# Patient Record
Sex: MALE | Race: OTHER | Employment: OTHER | URBAN - METROPOLITAN AREA
[De-identification: names, ages, dates, MRNs, and addresses within clinical notes are randomized per-mention and may not be internally consistent; named-entity substitution may affect disease eponyms.]

---

## 2018-10-18 ENCOUNTER — OFFICE VISIT (OUTPATIENT)
Dept: FAMILY MEDICINE CLINIC | Facility: CLINIC | Age: 68
End: 2018-10-18
Payer: MEDICARE

## 2018-10-18 VITALS
DIASTOLIC BLOOD PRESSURE: 72 MMHG | RESPIRATION RATE: 18 BRPM | SYSTOLIC BLOOD PRESSURE: 140 MMHG | HEIGHT: 64.2 IN | WEIGHT: 176 LBS | OXYGEN SATURATION: 99 % | HEART RATE: 98 BPM | TEMPERATURE: 98 F | BODY MASS INDEX: 30.05 KG/M2

## 2018-10-18 DIAGNOSIS — I10 ESSENTIAL HYPERTENSION, BENIGN: ICD-10-CM

## 2018-10-18 DIAGNOSIS — E11.9 TYPE 2 DIABETES MELLITUS WITHOUT COMPLICATION, WITHOUT LONG-TERM CURRENT USE OF INSULIN (HCC): Primary | ICD-10-CM

## 2018-10-18 DIAGNOSIS — E66.9 OBESITY (BMI 30-39.9): ICD-10-CM

## 2018-10-18 DIAGNOSIS — E78.5 HYPERLIPIDEMIA, UNSPECIFIED HYPERLIPIDEMIA TYPE: ICD-10-CM

## 2018-10-18 PROCEDURE — 80061 LIPID PANEL: CPT | Performed by: FAMILY MEDICINE

## 2018-10-18 PROCEDURE — 80053 COMPREHEN METABOLIC PANEL: CPT | Performed by: FAMILY MEDICINE

## 2018-10-18 PROCEDURE — 99213 OFFICE O/P EST LOW 20 MIN: CPT | Performed by: FAMILY MEDICINE

## 2018-10-18 PROCEDURE — 83036 HEMOGLOBIN GLYCOSYLATED A1C: CPT | Performed by: FAMILY MEDICINE

## 2018-10-18 PROCEDURE — 36415 COLL VENOUS BLD VENIPUNCTURE: CPT | Performed by: FAMILY MEDICINE

## 2018-10-18 RX ORDER — ATORVASTATIN CALCIUM 20 MG/1
TABLET, FILM COATED ORAL
Qty: 90 TABLET | Refills: 1 | Status: SHIPPED | OUTPATIENT
Start: 2018-10-18 | End: 2019-06-20

## 2018-10-18 RX ORDER — LOSARTAN POTASSIUM 100 MG/1
100 TABLET ORAL
Refills: 0 | COMMUNITY
Start: 2018-08-13 | End: 2018-11-28

## 2018-10-18 RX ORDER — ATORVASTATIN CALCIUM 20 MG/1
TABLET, FILM COATED ORAL
COMMUNITY
Start: 2018-06-12 | End: 2018-10-18

## 2018-10-18 NOTE — PROGRESS NOTES
CHIEF COMPLAINT: Patient presents with:  Diabetes: NP  Medication Request        HPI:     Jennifer Osorio is a 70year old male presents for DM2, HTN, and HL. Diabetes   He presents for his follow-up diabetic visit.  He has type 2 diabetes m than 1 year ago. The problem is controlled. Exacerbating diseases include diabetes and obesity. There are no known factors aggravating his hyperlipidemia. Pertinent negatives include no chest pain, leg pain, myalgias or shortness of breath.  Current antihyp Cardiovascular: Negative for chest pain, palpitations and PND. Gastrointestinal: Negative for nausea, vomiting, abdominal pain and diarrhea. Endocrine: Negative for polydipsia, polyphagia and polyuria. Musculoskeletal: Negative for myalgias.    Iwona Million last diabetic foot exam: 10/18/18  - last diabetic eye exam: March 2018 (Dr. Sven Cooks in [facility])  - cont meds: Metformin 500mg  daily  - discussed appropriate diabetic diet, regular exercise, and wt loss  - RTC in 6 months for f-u  - HEMOGLOBIN A1C; Fu and agrees with plan. Advised to call or RTC if symptoms persist or worsen.     Problem List:   Patient Active Problem List:     Benign essential hypertension     Hyperlipidemia     Type 2 diabetes mellitus without complication [facility])      Imaging & Referral

## 2018-10-18 NOTE — PATIENT INSTRUCTIONS
Diet: Diabetes  Food is an important tool that you can use to control diabetes and stay healthy. Eating well-balanced meals in the correct amounts will help you control your blood glucose levels and prevent low blood sugar reactions.  It will also help yo · Avoid added salt. It can contribute to high blood pressure, which can cause heart disease. People with diabetes already have a risk of high blood pressure and heart disease. · Stay at a healthy weight.  If you need to lose weight, cut down on your portio · High-fat dairy products, such as whole milk, cheese, cream cheese, and ice cream  Better choices:  · Lean beef, skinless white-meat poultry, fish  · Tomato sauce, vegetable puree  · Dried fruit, bagels, bread with jam  · Baked, broiled, steamed, or roast

## 2018-10-22 ENCOUNTER — TELEPHONE (OUTPATIENT)
Dept: FAMILY MEDICINE CLINIC | Facility: CLINIC | Age: 68
End: 2018-10-22

## 2018-10-22 NOTE — PROGRESS NOTES
A1c is slightly worse, up to 7.0. Cholesterol is at goal. Please continue Metformin and Atorvastatin as Rx'd. Has f-u appt scheduled in April 2019.

## 2018-11-28 RX ORDER — LOSARTAN POTASSIUM 100 MG/1
100 TABLET ORAL
Qty: 90 TABLET | Refills: 0 | Status: SHIPPED | OUTPATIENT
Start: 2018-11-28 | End: 2019-01-16

## 2018-11-28 NOTE — TELEPHONE ENCOUNTER
Pt requesting refill on Losartan, protocol passed, refill approved    LOV 10/8/18    LF prior provider    Future Appointments   Date Time Provider Nestor Mcgraw   4/18/2019 12:20 PM Jerardo Cervantes MD [facility] [place]

## 2018-12-13 ENCOUNTER — TELEPHONE (OUTPATIENT)
Dept: FAMILY MEDICINE CLINIC | Facility: CLINIC | Age: 68
End: 2018-12-13

## 2018-12-13 NOTE — TELEPHONE ENCOUNTER
Pt states he had his diabetic eye exam done in March 2018 at [facility]. [address] [place] [address] [phone]. Please call Dr. Kris Alan to have them fax us a copy. Thank you.

## 2018-12-18 NOTE — TELEPHONE ENCOUNTER
Spoke with Leslee Brittle at [facility], she will fax report.  Chart will be updated upon receipt

## 2019-01-17 RX ORDER — LOSARTAN POTASSIUM 100 MG/1
TABLET ORAL
Qty: 90 TABLET | Refills: 0 | Status: SHIPPED | OUTPATIENT
Start: 2019-01-17 | End: 2019-05-21

## 2019-01-17 NOTE — TELEPHONE ENCOUNTER
Pt requesting a refill of losartan, protocol passed. Refill approved.     LOV with PCP 10/18/18    LF 11/28/18 #90    Future Appointments   Date Time Provider Nestor Mcgraw   4/18/2019 12:20 PM Ludivina Guevara MD [facility] [place]

## 2019-02-15 DIAGNOSIS — E11.9 TYPE 2 DIABETES MELLITUS WITHOUT COMPLICATION, UNSPECIFIED WHETHER LONG TERM INSULIN USE (HCC): Primary | ICD-10-CM

## 2019-02-15 NOTE — TELEPHONE ENCOUNTER
Pt requesting refill of metformin, passed protocol , refill approved, sent to pharmacy:     Last Time Medication was Filled:  pt reported    Last Office Visit with PCP: 10/18/18    Future Appointments   Date Time Provider Nestor Mcgraw   4/18/2019 12

## 2019-05-28 RX ORDER — LOSARTAN POTASSIUM 100 MG/1
TABLET ORAL
Qty: 90 TABLET | Refills: 0 | Status: SHIPPED | OUTPATIENT
Start: 2019-05-28 | End: 2019-11-13

## 2019-05-31 ENCOUNTER — APPOINTMENT (OUTPATIENT)
Dept: LAB | Facility: HOSPITAL | Age: 69
End: 2019-05-31
Attending: FAMILY MEDICINE
Payer: MEDICARE

## 2019-05-31 DIAGNOSIS — E11.9 TYPE 2 DIABETES MELLITUS WITHOUT COMPLICATION, WITHOUT LONG-TERM CURRENT USE OF INSULIN (HCC): ICD-10-CM

## 2019-05-31 DIAGNOSIS — E78.5 HYPERLIPIDEMIA, UNSPECIFIED HYPERLIPIDEMIA TYPE: ICD-10-CM

## 2019-05-31 PROCEDURE — 83036 HEMOGLOBIN GLYCOSYLATED A1C: CPT

## 2019-05-31 PROCEDURE — 36415 COLL VENOUS BLD VENIPUNCTURE: CPT

## 2019-05-31 PROCEDURE — 80053 COMPREHEN METABOLIC PANEL: CPT

## 2019-05-31 PROCEDURE — 82043 UR ALBUMIN QUANTITATIVE: CPT

## 2019-05-31 PROCEDURE — 82570 ASSAY OF URINE CREATININE: CPT

## 2019-05-31 PROCEDURE — 80061 LIPID PANEL: CPT

## 2019-06-07 ENCOUNTER — TELEPHONE (OUTPATIENT)
Dept: FAMILY MEDICINE CLINIC | Facility: CLINIC | Age: 69
End: 2019-06-07

## 2019-06-07 NOTE — TELEPHONE ENCOUNTER
Spoke with patient discussed lab results, diabetes and cholesterol are well controlled. Patient has AWV scheduled with Dr. Luis Garvey on 6/20/2019. Patient verbalized understanding and agreeable to POC.

## 2019-06-07 NOTE — TELEPHONE ENCOUNTER
Result Notes for COMP METABOLIC PANEL (14)     Notes recorded by Dar Cifuentes DO on 6/7/2019 at 4:01 PM CDT  Please call patient needs to schedule diabetic visit with Dr. Juan Grimaldo and of June third week of June.  His diabetes and cholesterol are well co

## 2019-06-20 ENCOUNTER — OFFICE VISIT (OUTPATIENT)
Dept: FAMILY MEDICINE CLINIC | Facility: CLINIC | Age: 69
End: 2019-06-20
Payer: MEDICARE

## 2019-06-20 VITALS
BODY MASS INDEX: 29.9 KG/M2 | SYSTOLIC BLOOD PRESSURE: 152 MMHG | DIASTOLIC BLOOD PRESSURE: 70 MMHG | WEIGHT: 173 LBS | OXYGEN SATURATION: 99 % | TEMPERATURE: 98 F | RESPIRATION RATE: 16 BRPM | HEIGHT: 63.75 IN | HEART RATE: 73 BPM

## 2019-06-20 DIAGNOSIS — Z23 NEED FOR VACCINATION: ICD-10-CM

## 2019-06-20 DIAGNOSIS — Z00.00 ENCOUNTER FOR ROUTINE ADULT HEALTH EXAMINATION WITHOUT ABNORMAL FINDINGS: ICD-10-CM

## 2019-06-20 DIAGNOSIS — I10 ESSENTIAL HYPERTENSION, BENIGN: ICD-10-CM

## 2019-06-20 DIAGNOSIS — E11.9 TYPE 2 DIABETES MELLITUS WITHOUT COMPLICATION, UNSPECIFIED WHETHER LONG TERM INSULIN USE (HCC): ICD-10-CM

## 2019-06-20 DIAGNOSIS — E78.5 HYPERLIPIDEMIA, UNSPECIFIED HYPERLIPIDEMIA TYPE: ICD-10-CM

## 2019-06-20 DIAGNOSIS — Z12.5 SPECIAL SCREENING FOR MALIGNANT NEOPLASM OF PROSTATE: Primary | ICD-10-CM

## 2019-06-20 DIAGNOSIS — E11.9 TYPE 2 DIABETES MELLITUS WITHOUT COMPLICATION, WITHOUT LONG-TERM CURRENT USE OF INSULIN (HCC): ICD-10-CM

## 2019-06-20 PROCEDURE — G0439 PPPS, SUBSEQ VISIT: HCPCS | Performed by: FAMILY MEDICINE

## 2019-06-20 PROCEDURE — 99214 OFFICE O/P EST MOD 30 MIN: CPT | Performed by: FAMILY MEDICINE

## 2019-06-20 PROCEDURE — 36415 COLL VENOUS BLD VENIPUNCTURE: CPT | Performed by: FAMILY MEDICINE

## 2019-06-20 PROCEDURE — 90670 PCV13 VACCINE IM: CPT | Performed by: FAMILY MEDICINE

## 2019-06-20 PROCEDURE — G0009 ADMIN PNEUMOCOCCAL VACCINE: HCPCS | Performed by: FAMILY MEDICINE

## 2019-06-20 RX ORDER — ATORVASTATIN CALCIUM 20 MG/1
TABLET, FILM COATED ORAL
Qty: 90 TABLET | Refills: 1 | Status: SHIPPED | OUTPATIENT
Start: 2019-06-20 | End: 2019-11-19

## 2019-06-20 RX ORDER — HYDROCHLOROTHIAZIDE 25 MG/1
25 TABLET ORAL DAILY
Qty: 90 TABLET | Refills: 1 | Status: SHIPPED | OUTPATIENT
Start: 2019-06-20 | End: 2019-11-19

## 2019-06-20 NOTE — PROGRESS NOTES
Patient came in for draw of ordered fasting labs. Patient drawn out of right  AC, x 1 attempt and tolerated well. 1 tube drawn.

## 2019-06-20 NOTE — PROGRESS NOTES
REASON FOR VISIT:    Zunilda De Jesus is a 78year old male who presents for a Medicare Annual Wellness visit. Pt is here for medicare AWV. Overall he feels well. He has no complaints. His colonoscopy was done in Aug 2017.  He is due for his treatments include angiotensin blockers. There are no compliance problems. There is no history of PVD or retinopathy. Hyperlipidemia   This is a chronic problem. The current episode started more than 1 year ago. The problem is controlled.  Exacerbating you lost more than 10 pounds without trying?: 2 - No  Has your appetite been poor?: No  Type of Diet: Balanced  How does the patient maintain a good energy level?: Appropriate Exercise;Daily Walks  How would you describe your daily physical activity?: Mode Annually HgbA1C (%)   Date Value   05/31/2019 6.9 (H)       Fasting Blood Sugar (FSB)Annually Glucose (mg/dL)   Date Value   05/31/2019 114 (H)      Cardiovascular Disease Screening    LDL Annually LDL Cholesterol (mg/dL)   Date Value   05/31/2019 68 Grandfather    • No Known Problems Paternal Grandmother    • No Known Problems Paternal Grandfather    • No Known Problems Sister    • No Known Problems Brother    • No Known Problems Brother      Immunization History      Immunization History  Administere or edema  NEURO: Oriented times three, cranial nerves are intact, motor and sensory are grossly intact  FOOT: normal exam and diabetic monofilament testing normal on both feet  Bilateral barefoot skin diabetic exam is normal, visualized feet and the appear (14); Future  -     LIPID PANEL;  Future    Essential hypertension, benign  - BP elevated today, pt mentions BP readings at home are in the 150s  - start HCTZ 25 mg daily (R/B/SEs of new med d/w pt) and cont losartan 100 mg daily  - DASH diet, regular exerc

## 2019-08-21 DIAGNOSIS — I10 BENIGN ESSENTIAL HYPERTENSION: Primary | ICD-10-CM

## 2019-08-21 RX ORDER — LOSARTAN POTASSIUM 100 MG/1
TABLET ORAL
Qty: 90 TABLET | Refills: 0 | Status: SHIPPED | OUTPATIENT
Start: 2019-08-21 | End: 2019-11-13

## 2019-08-21 NOTE — TELEPHONE ENCOUNTER
Pt requesting refill of LOSARTAN 100 MG  , passed protocol ,  Will route to provider this medication has not been filled by provider previously     Last Time Medication was Filled:      LOSARTAN 100 MG Oral Tab 90 tablet 0 5/28/2019    Sig:   TAKE 1 TABL

## 2019-09-06 DIAGNOSIS — E11.9 TYPE 2 DIABETES MELLITUS WITHOUT COMPLICATION, UNSPECIFIED WHETHER LONG TERM INSULIN USE (HCC): ICD-10-CM

## 2019-09-14 DIAGNOSIS — E11.9 TYPE 2 DIABETES MELLITUS WITHOUT COMPLICATION, UNSPECIFIED WHETHER LONG TERM INSULIN USE (HCC): ICD-10-CM

## 2019-09-20 ENCOUNTER — MED REC SCAN ONLY (OUTPATIENT)
Dept: FAMILY MEDICINE CLINIC | Facility: CLINIC | Age: 69
End: 2019-09-20

## 2019-10-15 ENCOUNTER — LAB ENCOUNTER (OUTPATIENT)
Dept: LAB | Facility: HOSPITAL | Age: 69
End: 2019-10-15
Attending: FAMILY MEDICINE
Payer: MEDICARE

## 2019-10-15 DIAGNOSIS — E78.5 HYPERLIPIDEMIA, UNSPECIFIED HYPERLIPIDEMIA TYPE: ICD-10-CM

## 2019-10-15 DIAGNOSIS — E11.9 TYPE 2 DIABETES MELLITUS WITHOUT COMPLICATION, WITHOUT LONG-TERM CURRENT USE OF INSULIN (HCC): ICD-10-CM

## 2019-10-15 PROCEDURE — 80061 LIPID PANEL: CPT

## 2019-10-15 PROCEDURE — 36415 COLL VENOUS BLD VENIPUNCTURE: CPT

## 2019-10-15 PROCEDURE — 80053 COMPREHEN METABOLIC PANEL: CPT

## 2019-10-15 PROCEDURE — 83036 HEMOGLOBIN GLYCOSYLATED A1C: CPT

## 2019-11-13 DIAGNOSIS — I10 BENIGN ESSENTIAL HYPERTENSION: ICD-10-CM

## 2019-11-13 RX ORDER — LOSARTAN POTASSIUM 100 MG/1
TABLET ORAL
Qty: 90 TABLET | Refills: 0 | Status: SHIPPED | OUTPATIENT
Start: 2019-11-13 | End: 2019-11-19

## 2019-11-13 NOTE — TELEPHONE ENCOUNTER
Requested Prescriptions     Signed Prescriptions Disp Refills   • LOSARTAN 100 MG Oral Tab 90 tablet 0     Sig: TAKE 1 TABLET BY MOUTH EVERY DAY     Authorizing Provider: Jt Roach     Ordering User: Isabel Rapp       Pt requesting refill

## 2019-11-15 PROBLEM — I10 HTN (HYPERTENSION): Status: ACTIVE | Noted: 2019-01-16

## 2019-11-19 ENCOUNTER — OFFICE VISIT (OUTPATIENT)
Dept: FAMILY MEDICINE CLINIC | Facility: CLINIC | Age: 69
End: 2019-11-19
Payer: MEDICARE

## 2019-11-19 VITALS
DIASTOLIC BLOOD PRESSURE: 60 MMHG | WEIGHT: 166.19 LBS | BODY MASS INDEX: 28.72 KG/M2 | TEMPERATURE: 99 F | SYSTOLIC BLOOD PRESSURE: 128 MMHG | HEIGHT: 63.75 IN | OXYGEN SATURATION: 98 % | HEART RATE: 67 BPM

## 2019-11-19 DIAGNOSIS — E78.5 HYPERLIPIDEMIA, UNSPECIFIED HYPERLIPIDEMIA TYPE: ICD-10-CM

## 2019-11-19 DIAGNOSIS — E11.9 TYPE 2 DIABETES MELLITUS WITHOUT COMPLICATION, UNSPECIFIED WHETHER LONG TERM INSULIN USE (HCC): Primary | ICD-10-CM

## 2019-11-19 DIAGNOSIS — I10 ESSENTIAL HYPERTENSION, BENIGN: ICD-10-CM

## 2019-11-19 DIAGNOSIS — Z78.9 HEPATITIS B VACCINATION STATUS UNKNOWN: ICD-10-CM

## 2019-11-19 PROCEDURE — 99214 OFFICE O/P EST MOD 30 MIN: CPT | Performed by: FAMILY MEDICINE

## 2019-11-19 RX ORDER — HYDROCHLOROTHIAZIDE 25 MG/1
25 TABLET ORAL DAILY
Qty: 90 TABLET | Refills: 1 | Status: CANCELLED | OUTPATIENT
Start: 2019-11-19

## 2019-11-19 RX ORDER — LOSARTAN POTASSIUM AND HYDROCHLOROTHIAZIDE 25; 100 MG/1; MG/1
1 TABLET ORAL DAILY
Qty: 90 TABLET | Refills: 3 | Status: SHIPPED | OUTPATIENT
Start: 2019-11-19 | End: 2019-11-20 | Stop reason: RX

## 2019-11-19 RX ORDER — ATORVASTATIN CALCIUM 20 MG/1
TABLET, FILM COATED ORAL
Qty: 90 TABLET | Refills: 1 | Status: SHIPPED | OUTPATIENT
Start: 2019-11-19 | End: 2020-06-17

## 2019-11-19 NOTE — PATIENT INSTRUCTIONS
As of October 6th 2014, the Drug Enforcement Agency [facility]) is reclassifying all hydrocodone combination medications from Schedule III to Schedule II. This includes medications such as Norco, Vicodin, Lortab, Zohydro, and Vicoprofen.      What this means for

## 2019-11-20 RX ORDER — HYDROCHLOROTHIAZIDE 25 MG/1
TABLET ORAL
Qty: 90 TABLET | Refills: 1 | Status: SHIPPED | OUTPATIENT
Start: 2019-11-20 | End: 2020-05-14

## 2019-11-20 RX ORDER — LOSARTAN POTASSIUM 100 MG/1
100 TABLET ORAL DAILY
Qty: 90 TABLET | Refills: 1 | Status: SHIPPED | OUTPATIENT
Start: 2019-11-20 | End: 2020-07-29

## 2019-11-20 NOTE — TELEPHONE ENCOUNTER
Requested Prescriptions     Signed Prescriptions Disp Refills   • HYDROCHLOROTHIAZIDE 25 MG Oral Tab 90 tablet 1     Sig: TAKE 1 TABLET BY MOUTH EVERY DAY     Authorizing Provider: Bronwyn De Dios     Ordering User: MANDIE La   • losartan 100

## 2019-11-29 DIAGNOSIS — E11.9 TYPE 2 DIABETES MELLITUS WITHOUT COMPLICATION, UNSPECIFIED WHETHER LONG TERM INSULIN USE (HCC): ICD-10-CM

## 2019-11-29 NOTE — TELEPHONE ENCOUNTER
Requested Prescriptions     Signed Prescriptions Disp Refills   • metFORMIN HCl 500 MG Oral Tab 90 tablet 0     Sig: TAKE 1 TABLET BY MOUTH Daily     Authorizing Provider: Roswell Curling     Ordering User: Unique Powell     Refused Prescriptio

## 2019-12-14 DIAGNOSIS — E11.9 TYPE 2 DIABETES MELLITUS WITHOUT COMPLICATION, UNSPECIFIED WHETHER LONG TERM INSULIN USE (HCC): ICD-10-CM

## 2020-02-18 ENCOUNTER — MED REC SCAN ONLY (OUTPATIENT)
Dept: FAMILY MEDICINE CLINIC | Facility: CLINIC | Age: 70
End: 2020-02-18

## 2020-02-20 DIAGNOSIS — E11.9 TYPE 2 DIABETES MELLITUS WITHOUT COMPLICATION, UNSPECIFIED WHETHER LONG TERM INSULIN USE (HCC): ICD-10-CM

## 2020-02-21 NOTE — TELEPHONE ENCOUNTER
Pt requesting refill of   Requested Prescriptions     Pending Prescriptions Disp Refills   • metFORMIN HCl 500 MG Oral Tab [Pharmacy Med Name: metFORMIN HCl Oral Tablet 500 MG] 90 tablet 0     Sig: TAKE 1 TABLET BY MOUTH ONE TIME A DAY   Passed protocol, r

## 2020-04-23 DIAGNOSIS — E11.9 TYPE 2 DIABETES MELLITUS WITHOUT COMPLICATION, UNSPECIFIED WHETHER LONG TERM INSULIN USE (HCC): ICD-10-CM

## 2020-05-14 DIAGNOSIS — I10 ESSENTIAL HYPERTENSION, BENIGN: ICD-10-CM

## 2020-05-14 RX ORDER — HYDROCHLOROTHIAZIDE 25 MG/1
TABLET ORAL
Qty: 90 TABLET | Refills: 1 | Status: SHIPPED | OUTPATIENT
Start: 2020-05-14 | End: 2020-10-28

## 2020-05-19 ENCOUNTER — LAB ENCOUNTER (OUTPATIENT)
Dept: LAB | Facility: HOSPITAL | Age: 70
End: 2020-05-19
Attending: FAMILY MEDICINE
Payer: MEDICARE

## 2020-05-19 DIAGNOSIS — Z78.9 HEPATITIS B VACCINATION STATUS UNKNOWN: ICD-10-CM

## 2020-05-19 DIAGNOSIS — E11.9 TYPE 2 DIABETES MELLITUS WITHOUT COMPLICATION, UNSPECIFIED WHETHER LONG TERM INSULIN USE (HCC): ICD-10-CM

## 2020-05-19 PROCEDURE — 83036 HEMOGLOBIN GLYCOSYLATED A1C: CPT

## 2020-05-19 PROCEDURE — 86706 HEP B SURFACE ANTIBODY: CPT

## 2020-05-19 PROCEDURE — 80053 COMPREHEN METABOLIC PANEL: CPT

## 2020-05-19 PROCEDURE — 82570 ASSAY OF URINE CREATININE: CPT

## 2020-05-19 PROCEDURE — 80061 LIPID PANEL: CPT

## 2020-05-19 PROCEDURE — 80500 HEPATITIS B PROFILE: CPT

## 2020-05-19 PROCEDURE — 82043 UR ALBUMIN QUANTITATIVE: CPT

## 2020-05-19 PROCEDURE — 86704 HEP B CORE ANTIBODY TOTAL: CPT

## 2020-05-19 PROCEDURE — 36415 COLL VENOUS BLD VENIPUNCTURE: CPT

## 2020-05-19 PROCEDURE — 87340 HEPATITIS B SURFACE AG IA: CPT

## 2020-05-20 ENCOUNTER — TELEPHONE (OUTPATIENT)
Dept: FAMILY MEDICINE CLINIC | Facility: CLINIC | Age: 70
End: 2020-05-20

## 2020-05-20 NOTE — TELEPHONE ENCOUNTER
Your appointments     Date & Time Appointment Department [facility])    Jun 17, 2020 10:40 AM CDT Follow Up Visit with Alissa Daniels MD [place] [id number], [facility] David Durand ([address])            [address] [facility], [place]

## 2020-05-20 NOTE — TELEPHONE ENCOUNTER
Please call pt to schedule Medicare AWV via telephone visit ONLY if he has BP cuff and scale (since we have to report weight and BP during this type of visit).  If not, then please schedule him for just a regular f-u telephone visit for his diabetes, HL, an

## 2020-05-20 NOTE — PROGRESS NOTES
Sent message to triage RN to call pt schedule Medicare AWV and to review these results (if pt has BP cuff and scale at home), if not, then needs a telephone f-u visit:    - CMP normal (a little dehydrated with elevated BUN)  - lipids ok  - A1c 7.3 (worse,

## 2020-05-26 NOTE — PROGRESS NOTES
Pt notified via MyChart:  - CMP normal (a little dehydrated with elevated BUN)  - lipids ok  - A1c 7.3 (worse, up from 7.1)  - urine microalbumin normal  - Hep B- no immunity, recommend vaccine

## 2020-06-17 ENCOUNTER — OFFICE VISIT (OUTPATIENT)
Dept: FAMILY MEDICINE CLINIC | Facility: CLINIC | Age: 70
End: 2020-06-17
Payer: MEDICARE

## 2020-06-17 VITALS
HEIGHT: 63.75 IN | HEART RATE: 82 BPM | DIASTOLIC BLOOD PRESSURE: 72 MMHG | OXYGEN SATURATION: 96 % | SYSTOLIC BLOOD PRESSURE: 158 MMHG | BODY MASS INDEX: 29.28 KG/M2 | TEMPERATURE: 98 F | WEIGHT: 169.38 LBS | RESPIRATION RATE: 18 BRPM

## 2020-06-17 DIAGNOSIS — I10 ESSENTIAL HYPERTENSION, BENIGN: Primary | ICD-10-CM

## 2020-06-17 DIAGNOSIS — E78.5 HYPERLIPIDEMIA, UNSPECIFIED HYPERLIPIDEMIA TYPE: ICD-10-CM

## 2020-06-17 DIAGNOSIS — E11.9 TYPE 2 DIABETES MELLITUS WITHOUT COMPLICATION, UNSPECIFIED WHETHER LONG TERM INSULIN USE (HCC): ICD-10-CM

## 2020-06-17 PROCEDURE — 99214 OFFICE O/P EST MOD 30 MIN: CPT | Performed by: FAMILY MEDICINE

## 2020-06-17 RX ORDER — ATORVASTATIN CALCIUM 20 MG/1
TABLET, FILM COATED ORAL
Qty: 90 TABLET | Refills: 1 | Status: SHIPPED | OUTPATIENT
Start: 2020-06-17 | End: 2020-12-21

## 2020-06-18 RX ORDER — ATORVASTATIN CALCIUM 20 MG/1
TABLET, FILM COATED ORAL
Qty: 90 TABLET | Refills: 0 | OUTPATIENT
Start: 2020-06-18

## 2020-06-18 NOTE — PROGRESS NOTES
CHIEF COMPLAINT: Patient presents with:  Diabetes        HPI:     Andressa Casas is a 69year old male presents for lab results f-u. Diabetes f-u: He feels well. He states that he has not had any issues with his sugars.  He has no polyuria, started more than 1 year ago. The problem is unchanged. The problem is controlled. Pertinent negatives include no anxiety, blurred vision, chest pain, headaches, palpitations, peripheral edema, PND or shortness of breath.  There are no associated agents to Problems Brother    • No Known Problems Brother       Social History: Social History    Tobacco Use      Smoking status: Never Smoker      Smokeless tobacco: Never Used    Alcohol use: No      Frequency: Never    Drug use: No       Medications (Active prio sounds normal. No respiratory distress. Abdominal: Soft. Bowel sounds are normal. He exhibits no distension. There is no hepatosplenomegaly. There is no tenderness. Lymphadenopathy:     He has no cervical adenopathy.    Neurological: He is alert and denny Nonreactive     • Heptatitis B Surface Ab * 05/19/2020 <3.10    • Hepatitis B Core Ab,Total 05/19/2020 Nonreactive        REVIEWED THIS VISIT  ASSESSMENT/PLAN:   69year old male with    1.  Essential hypertension, benign  - BP elevated, improved on recheck 06/20/2020  Annual Physical due on 06/20/2020  Diabetes Care A1C due on 11/19/2020  Diabetes Care Dilated Eye Exam due on 02/17/2021  LDL Control due on 05/19/2021  PSA due on 06/20/2021  Colonoscopy due on 08/03/2022  Influenza Vaccine Completed  Pneumoco

## 2020-06-21 DIAGNOSIS — E11.9 TYPE 2 DIABETES MELLITUS WITHOUT COMPLICATION, UNSPECIFIED WHETHER LONG TERM INSULIN USE (HCC): ICD-10-CM

## 2020-06-22 NOTE — TELEPHONE ENCOUNTER
Pt requesting refill of   Requested Prescriptions     Signed Prescriptions Disp Refills   • metFORMIN HCl 500 MG Oral Tab 90 tablet 0     Sig: TAKE 1 TABLET BY MOUTH ONE TIME A DAY     Authorizing Provider: Ashley Chauhan     Ordering User: Yamilet Espinoza

## 2020-07-09 ENCOUNTER — TELEPHONE (OUTPATIENT)
Dept: FAMILY MEDICINE CLINIC | Facility: CLINIC | Age: 70
End: 2020-07-09

## 2020-07-27 ENCOUNTER — TELEPHONE (OUTPATIENT)
Dept: FAMILY MEDICINE CLINIC | Facility: CLINIC | Age: 70
End: 2020-07-27

## 2020-07-27 DIAGNOSIS — E11.9 TYPE 2 DIABETES MELLITUS WITHOUT COMPLICATION, UNSPECIFIED WHETHER LONG TERM INSULIN USE (HCC): ICD-10-CM

## 2020-07-27 NOTE — TELEPHONE ENCOUNTER
Pt requesting refill of   Requested Prescriptions     Pending Prescriptions Disp Refills   • metFORMIN HCl 500 MG Oral Tab [Pharmacy Med Name: METFORMIN  MG TABLET] 90 tablet 0     Sig: TAKE 1 TABLET BY MOUTH TWICE A DAY WITH MEALS     Denied refi

## 2020-07-29 ENCOUNTER — TELEPHONE (OUTPATIENT)
Dept: FAMILY MEDICINE CLINIC | Facility: CLINIC | Age: 70
End: 2020-07-29

## 2020-07-29 DIAGNOSIS — E11.9 TYPE 2 DIABETES MELLITUS WITHOUT COMPLICATION, UNSPECIFIED WHETHER LONG TERM INSULIN USE (HCC): ICD-10-CM

## 2020-07-29 DIAGNOSIS — I10 ESSENTIAL HYPERTENSION, BENIGN: Primary | ICD-10-CM

## 2020-07-29 RX ORDER — LOSARTAN POTASSIUM 100 MG/1
100 TABLET ORAL DAILY
Qty: 90 TABLET | Refills: 1 | Status: SHIPPED | OUTPATIENT
Start: 2020-07-29 | End: 2021-01-06

## 2020-07-29 NOTE — TELEPHONE ENCOUNTER
Patient returning call. Per patient, he was instructed to take Losartan BID at 6/17/2020 office visit. Has been doing so since that time. Per med list and office note, there is no mention of dose increase.  Records indicate that he is prescribed Losar

## 2020-07-29 NOTE — TELEPHONE ENCOUNTER
Per pharmacy note, patient reports taking Losartan BID. No record of this. Call placed to patient for clarification. Did another doctor make this change? LMOM to return call to the office.  Provided pt office phone [phone] along with office ho

## 2020-07-29 NOTE — TELEPHONE ENCOUNTER
Losartan was unchanged at 6/17 visit, he is still to take daily as prescribed. Per my OV note that day, Metformin was CHANGED to 500 mg BID (from daily). (I just updated the medication list now, since I did not do it at the time of the visit).     Thank

## 2020-07-29 NOTE — TELEPHONE ENCOUNTER
Call placed to patient. Advised patient to take losartan 100 mg once daily and metformin 500 mg BID. Patient verbalized understanding.

## 2020-08-04 ENCOUNTER — PATIENT OUTREACH (OUTPATIENT)
Dept: FAMILY MEDICINE CLINIC | Facility: CLINIC | Age: 70
End: 2020-08-04

## 2020-08-05 DIAGNOSIS — E11.9 TYPE 2 DIABETES MELLITUS WITHOUT COMPLICATION, UNSPECIFIED WHETHER LONG TERM INSULIN USE (HCC): ICD-10-CM

## 2020-08-06 NOTE — TELEPHONE ENCOUNTER
Requested Prescriptions     Signed Prescriptions Disp Refills   • metFORMIN HCl 500 MG Oral Tab 90 tablet 0     Sig: Take 1 tablet (500 mg total) by mouth 2 (two) times daily with meals.  Type 2 diabetes mellitus without complication, unspecified whether lo

## 2020-09-13 DIAGNOSIS — E11.9 TYPE 2 DIABETES MELLITUS WITHOUT COMPLICATION, UNSPECIFIED WHETHER LONG TERM INSULIN USE (HCC): ICD-10-CM

## 2020-09-14 NOTE — TELEPHONE ENCOUNTER
Pt requesting refill of   Requested Prescriptions     Pending Prescriptions Disp Refills   • METFORMIN  MG Oral Tab [Pharmacy Med Name: METFORMIN  MG TABLET] 90 tablet 0     Sig: TAKE 1 TABLET BY MOUTH 2 TIMES DAILY WITH MEALS.      Passed p

## 2020-09-17 ENCOUNTER — OFFICE VISIT (OUTPATIENT)
Dept: FAMILY MEDICINE CLINIC | Facility: CLINIC | Age: 70
End: 2020-09-17
Payer: MEDICARE

## 2020-09-17 VITALS
DIASTOLIC BLOOD PRESSURE: 82 MMHG | OXYGEN SATURATION: 97 % | WEIGHT: 165.19 LBS | RESPIRATION RATE: 16 BRPM | HEIGHT: 63.75 IN | TEMPERATURE: 99 F | BODY MASS INDEX: 28.55 KG/M2 | HEART RATE: 67 BPM | SYSTOLIC BLOOD PRESSURE: 128 MMHG

## 2020-09-17 DIAGNOSIS — I10 ESSENTIAL HYPERTENSION, BENIGN: ICD-10-CM

## 2020-09-17 DIAGNOSIS — E78.5 HYPERLIPIDEMIA, UNSPECIFIED HYPERLIPIDEMIA TYPE: ICD-10-CM

## 2020-09-17 DIAGNOSIS — Z00.00 ENCOUNTER FOR ROUTINE ADULT HEALTH EXAMINATION WITHOUT ABNORMAL FINDINGS: Primary | ICD-10-CM

## 2020-09-17 DIAGNOSIS — E11.9 TYPE 2 DIABETES MELLITUS WITHOUT COMPLICATION, UNSPECIFIED WHETHER LONG TERM INSULIN USE (HCC): ICD-10-CM

## 2020-09-17 DIAGNOSIS — E66.3 OVERWEIGHT (BMI 25.0-29.9): ICD-10-CM

## 2020-09-17 DIAGNOSIS — Z23 FLU VACCINE NEED: ICD-10-CM

## 2020-09-17 PROCEDURE — G0008 ADMIN INFLUENZA VIRUS VAC: HCPCS | Performed by: FAMILY MEDICINE

## 2020-09-17 PROCEDURE — G0439 PPPS, SUBSEQ VISIT: HCPCS | Performed by: FAMILY MEDICINE

## 2020-09-17 PROCEDURE — 90662 IIV NO PRSV INCREASED AG IM: CPT | Performed by: FAMILY MEDICINE

## 2020-09-17 NOTE — PATIENT INSTRUCTIONS
Influenza Virus Vaccine injection  Brand Names: Afluria Quadrivalent, FLUAD, Fluarix Quadrivalent, Flublok Quadrivalent, FLUCELVAX, Flulaval, Fluzone  What is this medicine?   INFLUENZA VIRUS VACCINE (in [facility]) helps to reduce the What should I tell my health care provider before I take this medicine?   They need to know if you have any of these conditions:  · bleeding disorder like hemophilia  · fever or infection  · Guillain-[place] syndrome or other neurological problems  · immune s

## 2020-09-17 NOTE — PROGRESS NOTES
REASON FOR VISIT:    Andressa Casas is a 69year old male who presents for a Medicare Annual Wellness visit. Diabetes f-u: He states he feels his blood sugar is well-controlled. Dashawn Ireland His fasting blood sugars are in the 130s average.   He has n mellitus without complication (HCC)     HTN (hypertension)    Current Outpatient Medications   Medication Sig Dispense Refill   • METFORMIN  MG Oral Tab TAKE 1 TABLET BY MOUTH 2 TIMES DAILY WITH MEALS.  90 tablet 0   • losartan 100 MG Oral Tab Take 1 without help  Eating: Able without help  Driving: Able without help  Preparing your meals: Able without help  Managing money/bills: Able without help  Taking medications as prescribed: Able without help  Are you able to afford your medications?: Yes  Adrianna Diaz Medications  (ACE/ARB, Digoxin, Diuretics)        Potassium  Annually Potassium (mmol/L)   Date Value   05/19/2020 4.0       Creatinine  Annually Creatinine (mg/dL)   Date Value   05/19/2020 1.01       Digoxin Serum Conc  Annually No results found for: DIG shortness of breath with exertion  CARDIOVASCULAR: denies chest pain on exertion  GI: denies abdominal pain, denies heartburn  : denies nocturia or changes in stream  MUSCULOSKELETAL: denies back pain  NEURO: denies headaches  PSYCHE: denies depression o dose  - RTC in 3 months for f-u (please complete fasting  labs prior to visit)     - LIPID PANEL; Future  - COMP METABOLIC PANEL (14); Future     2.  Type 2 diabetes mellitus without complication, unspecified whether long term insulin use (HCC)  - last A1c

## 2020-10-23 DIAGNOSIS — E11.9 TYPE 2 DIABETES MELLITUS WITHOUT COMPLICATION, UNSPECIFIED WHETHER LONG TERM INSULIN USE (HCC): ICD-10-CM

## 2020-10-26 NOTE — TELEPHONE ENCOUNTER
Pt requesting refill of   Requested Prescriptions     Pending Prescriptions Disp Refills   • METFORMIN  MG Oral Tab [Pharmacy Med Name: METFORMIN  MG TABLET] 90 tablet 0     Sig: TAKE 1 TABLET BY MOUTH TWICE A DAY WITH MEALS       Passed pr

## 2020-10-28 DIAGNOSIS — I10 ESSENTIAL HYPERTENSION, BENIGN: ICD-10-CM

## 2020-10-28 RX ORDER — HYDROCHLOROTHIAZIDE 25 MG/1
TABLET ORAL
Qty: 90 TABLET | Refills: 0 | Status: SHIPPED | OUTPATIENT
Start: 2020-10-28 | End: 2020-12-21

## 2020-10-28 NOTE — TELEPHONE ENCOUNTER
Pt requesting refill of   Requested Prescriptions     Pending Prescriptions Disp Refills   • HYDROCHLOROTHIAZIDE 25 MG Oral Tab [Pharmacy Med Name: HYDROCHLOROTHIAZIDE 25 MG TAB] 90 tablet 1     Sig: TAKE 1 TABLET BY MOUTH EVERY DAY     Passed protocol,

## 2020-11-04 ENCOUNTER — LAB ENCOUNTER (OUTPATIENT)
Dept: LAB | Facility: HOSPITAL | Age: 70
End: 2020-11-04
Attending: FAMILY MEDICINE
Payer: MEDICARE

## 2020-11-04 DIAGNOSIS — I10 ESSENTIAL HYPERTENSION, BENIGN: ICD-10-CM

## 2020-11-04 DIAGNOSIS — E78.5 HYPERLIPIDEMIA, UNSPECIFIED HYPERLIPIDEMIA TYPE: ICD-10-CM

## 2020-11-04 DIAGNOSIS — E11.9 TYPE 2 DIABETES MELLITUS WITHOUT COMPLICATION, UNSPECIFIED WHETHER LONG TERM INSULIN USE (HCC): ICD-10-CM

## 2020-11-04 PROCEDURE — 36415 COLL VENOUS BLD VENIPUNCTURE: CPT

## 2020-11-04 PROCEDURE — 83036 HEMOGLOBIN GLYCOSYLATED A1C: CPT

## 2020-11-04 PROCEDURE — 80053 COMPREHEN METABOLIC PANEL: CPT

## 2020-11-04 PROCEDURE — 80061 LIPID PANEL: CPT

## 2020-11-05 NOTE — PROGRESS NOTES
- diabetes is improved, A1c down to 7.0 (on Metformin 500mg BID)  - lipids imrproved, TG still borderline  - LFTs and Cr are essentially normal (slight dehydration since fasting)

## 2020-12-02 DIAGNOSIS — E11.9 TYPE 2 DIABETES MELLITUS WITHOUT COMPLICATION, UNSPECIFIED WHETHER LONG TERM INSULIN USE (HCC): ICD-10-CM

## 2020-12-02 NOTE — TELEPHONE ENCOUNTER
Pt requesting refill of   Requested Prescriptions     Pending Prescriptions Disp Refills   • METFORMIN  MG Oral Tab [Pharmacy Med Name: METFORMIN  MG TABLET] 90 tablet 0     Sig: TAKE 1 TABLET BY MOUTH TWICE A DAY WITH MEALS     Passed protoc

## 2020-12-21 ENCOUNTER — OFFICE VISIT (OUTPATIENT)
Dept: FAMILY MEDICINE CLINIC | Facility: CLINIC | Age: 70
End: 2020-12-21
Payer: MEDICARE

## 2020-12-21 VITALS
WEIGHT: 165 LBS | OXYGEN SATURATION: 98 % | RESPIRATION RATE: 18 BRPM | HEART RATE: 78 BPM | HEIGHT: 63.75 IN | DIASTOLIC BLOOD PRESSURE: 66 MMHG | SYSTOLIC BLOOD PRESSURE: 142 MMHG | TEMPERATURE: 97 F | BODY MASS INDEX: 28.52 KG/M2

## 2020-12-21 DIAGNOSIS — E78.5 HYPERLIPIDEMIA, UNSPECIFIED HYPERLIPIDEMIA TYPE: ICD-10-CM

## 2020-12-21 DIAGNOSIS — I10 ESSENTIAL HYPERTENSION, BENIGN: ICD-10-CM

## 2020-12-21 DIAGNOSIS — E11.9 TYPE 2 DIABETES MELLITUS WITHOUT COMPLICATION, UNSPECIFIED WHETHER LONG TERM INSULIN USE (HCC): ICD-10-CM

## 2020-12-21 PROCEDURE — 99214 OFFICE O/P EST MOD 30 MIN: CPT | Performed by: FAMILY MEDICINE

## 2020-12-21 RX ORDER — HYDROCHLOROTHIAZIDE 25 MG/1
25 TABLET ORAL DAILY
Qty: 90 TABLET | Refills: 1 | Status: SHIPPED | OUTPATIENT
Start: 2020-12-21 | End: 2021-08-25

## 2020-12-21 RX ORDER — ATORVASTATIN CALCIUM 20 MG/1
TABLET, FILM COATED ORAL
Qty: 90 TABLET | Refills: 1 | OUTPATIENT
Start: 2020-12-21

## 2020-12-21 RX ORDER — ATORVASTATIN CALCIUM 20 MG/1
TABLET, FILM COATED ORAL
Qty: 90 TABLET | Refills: 1 | Status: SHIPPED | OUTPATIENT
Start: 2020-12-21 | End: 2021-06-07

## 2020-12-21 RX ORDER — LOSARTAN POTASSIUM 100 MG/1
100 TABLET ORAL DAILY
Qty: 90 TABLET | Refills: 1 | Status: CANCELLED | OUTPATIENT
Start: 2020-12-21

## 2020-12-21 NOTE — PROGRESS NOTES
CHIEF COMPLAINT: Patient presents with:  Diabetes: follow up         HPI:     Coby Castellanos is a 69year old male presents for DM2, HTN, HL f-u. Diabetes f-u: His most recent A1c was down to 7.0, he is feeling well.  His blood sugar has be No Known Problems Son    • No Known Problems Maternal Grandmother    • No Known Problems Maternal Grandfather    • No Known Problems Paternal Grandmother    • No Known Problems Paternal Grandfather    • No Known Problems Sister    • No Known Problems Broth reviewed. Appears stated age, well groomed. Physical Exam   Vitals reviewed. Constitutional: He is oriented to person, place, and time. He appears well-developed and well-nourished. No distress. HENT:   Head: Normocephalic.    Eyes: Conjunctivae are nor VISIT  ASSESSMENT/PLAN:   69year old male with    1.  Type 2 diabetes mellitus without complication, unspecified whether long term insulin use (HCC)  - last A1c in Dec  2020 : 7.0 (down from 7.3)  - last microalbumin: May 2020 (normal)  - last diabetic zackary 02/17/2021  Diabetes Care A1C due on 05/04/2021  PSA due on 06/20/2021  Fall Risk Screening due on 09/17/2021  Annual Depression Screen due on 09/17/2021  Annual Physical due on 09/17/2021  LDL Control due on 11/04/2021  Colonoscopy due on 08/03/2022  Infl

## 2021-01-06 DIAGNOSIS — I10 ESSENTIAL HYPERTENSION, BENIGN: ICD-10-CM

## 2021-01-06 RX ORDER — LOSARTAN POTASSIUM 100 MG/1
TABLET ORAL
Qty: 90 TABLET | Refills: 1 | Status: SHIPPED | OUTPATIENT
Start: 2021-01-06 | End: 2021-08-09

## 2021-01-06 NOTE — TELEPHONE ENCOUNTER
Pt requesting refill of   Requested Prescriptions     Pending Prescriptions Disp Refills   • LOSARTAN 100 MG Oral Tab [Pharmacy Med Name: LOSARTAN POTASSIUM 100 MG TAB] 90 tablet 1     Sig: TAKE 1 TABLET BY MOUTH EVERY DAY     Passed protocol, refill appro

## 2021-02-03 DIAGNOSIS — Z23 NEED FOR VACCINATION: ICD-10-CM

## 2021-02-15 ENCOUNTER — LAB ENCOUNTER (OUTPATIENT)
Dept: LAB | Age: 71
End: 2021-02-15
Attending: FAMILY MEDICINE
Payer: MEDICARE

## 2021-02-15 DIAGNOSIS — E11.9 TYPE 2 DIABETES MELLITUS WITHOUT COMPLICATION, UNSPECIFIED WHETHER LONG TERM INSULIN USE (HCC): ICD-10-CM

## 2021-02-15 LAB
ALBUMIN SERPL-MCNC: 3.8 G/DL (ref 3.4–5)
ALBUMIN/GLOB SERPL: 0.9 {RATIO} (ref 1–2)
ALP LIVER SERPL-CCNC: 71 U/L
ALT SERPL-CCNC: 18 U/L
ANION GAP SERPL CALC-SCNC: 4 MMOL/L (ref 0–18)
AST SERPL-CCNC: 17 U/L (ref 15–37)
BILIRUB SERPL-MCNC: 0.6 MG/DL (ref 0.1–2)
BUN BLD-MCNC: 18 MG/DL (ref 7–18)
BUN/CREAT SERPL: 17.3 (ref 10–20)
CALCIUM BLD-MCNC: 10 MG/DL (ref 8.5–10.1)
CHLORIDE SERPL-SCNC: 104 MMOL/L (ref 98–112)
CHOLEST SMN-MCNC: 166 MG/DL (ref ?–200)
CO2 SERPL-SCNC: 30 MMOL/L (ref 21–32)
CREAT BLD-MCNC: 1.04 MG/DL
CREAT UR-SCNC: 45.6 MG/DL
EST. AVERAGE GLUCOSE BLD GHB EST-MCNC: 148 MG/DL (ref 68–126)
GLOBULIN PLAS-MCNC: 4.4 G/DL (ref 2.8–4.4)
GLUCOSE BLD-MCNC: 126 MG/DL (ref 70–99)
HBA1C MFR BLD HPLC: 6.8 % (ref ?–5.7)
HDLC SERPL-MCNC: 45 MG/DL (ref 40–59)
LDLC SERPL CALC-MCNC: 100 MG/DL (ref ?–100)
M PROTEIN MFR SERPL ELPH: 8.2 G/DL (ref 6.4–8.2)
MICROALBUMIN UR-MCNC: 1.86 MG/DL
MICROALBUMIN/CREAT 24H UR-RTO: 40.8 UG/MG (ref ?–30)
NONHDLC SERPL-MCNC: 121 MG/DL (ref ?–130)
OSMOLALITY SERPL CALC.SUM OF ELEC: 289 MOSM/KG (ref 275–295)
PATIENT FASTING Y/N/NP: YES
PATIENT FASTING Y/N/NP: YES
POTASSIUM SERPL-SCNC: 3.9 MMOL/L (ref 3.5–5.1)
SODIUM SERPL-SCNC: 138 MMOL/L (ref 136–145)
TRIGL SERPL-MCNC: 106 MG/DL (ref 30–149)
VLDLC SERPL CALC-MCNC: 21 MG/DL (ref 0–30)

## 2021-02-15 PROCEDURE — 82043 UR ALBUMIN QUANTITATIVE: CPT

## 2021-02-15 PROCEDURE — 82570 ASSAY OF URINE CREATININE: CPT

## 2021-02-15 PROCEDURE — 83036 HEMOGLOBIN GLYCOSYLATED A1C: CPT

## 2021-02-15 PROCEDURE — 36415 COLL VENOUS BLD VENIPUNCTURE: CPT

## 2021-02-15 PROCEDURE — 80053 COMPREHEN METABOLIC PANEL: CPT

## 2021-02-15 PROCEDURE — 80061 LIPID PANEL: CPT

## 2021-02-16 NOTE — PROGRESS NOTES
Pt notified via [email or web address]:  - A1c improved, down to 6.8 (from 7.0)  - microalbumin abnormal  - lipids at goal   - CMP normal    Has upcoming appt on 3/30/21

## 2021-02-18 ENCOUNTER — TELEPHONE (OUTPATIENT)
Dept: FAMILY MEDICINE CLINIC | Facility: CLINIC | Age: 71
End: 2021-02-18

## 2021-03-29 ENCOUNTER — TELEPHONE (OUTPATIENT)
Dept: FAMILY MEDICINE CLINIC | Facility: CLINIC | Age: 71
End: 2021-03-29

## 2021-03-29 NOTE — TELEPHONE ENCOUNTER
Spoke to pt and pt's wife. Pt and his wife tested positive for Covid approx 2 weeks ago and have been quarantined at home ever since  No longer having fevers  Ongoing symptoms include non-stop cough.    Denies SOB difficulty breathing or other symptoms   Un

## 2021-04-05 ENCOUNTER — TELEPHONE (OUTPATIENT)
Dept: FAMILY MEDICINE CLINIC | Facility: CLINIC | Age: 71
End: 2021-04-05

## 2021-04-05 NOTE — TELEPHONE ENCOUNTER
He should f-u with Pulm about the O2. Thanks. If he passes screening for 4/14 visit and feels well enough to come in from [place], we'll see him then. Thanks!

## 2021-04-05 NOTE — TELEPHONE ENCOUNTER
Incoming call from pt's wife  She states pt was at ER in [place] for Covid pneumonia 3/30/21 - record in [facility]  He was put on oxygen upon discharge  They do not have the home O2 monitor yet   Pt has an appt 4/14/21 with Cherelle Diana MD. As

## 2021-04-06 NOTE — TELEPHONE ENCOUNTER
Spoke to pt's wife Quita Valle and let her know that pt should f/up with pulmonology regarding O2 use. She states they will f/up with pulm seen in [place].   Anjali Reddy for appt 4/14/21 as long as he is symptom free x 24 hours prior to appt and passes screening   Q[place]a Valle voiced

## 2021-04-14 ENCOUNTER — OFFICE VISIT (OUTPATIENT)
Dept: FAMILY MEDICINE CLINIC | Facility: CLINIC | Age: 71
End: 2021-04-14
Payer: MEDICARE

## 2021-04-14 VITALS
SYSTOLIC BLOOD PRESSURE: 136 MMHG | WEIGHT: 152.81 LBS | BODY MASS INDEX: 26.41 KG/M2 | DIASTOLIC BLOOD PRESSURE: 72 MMHG | HEART RATE: 100 BPM | TEMPERATURE: 97 F | HEIGHT: 63.75 IN | OXYGEN SATURATION: 91 % | RESPIRATION RATE: 22 BRPM

## 2021-04-14 DIAGNOSIS — U07.1 COVID-19 VIRUS INFECTION: Primary | ICD-10-CM

## 2021-04-14 DIAGNOSIS — I10 ESSENTIAL HYPERTENSION, BENIGN: ICD-10-CM

## 2021-04-14 DIAGNOSIS — E11.9 TYPE 2 DIABETES MELLITUS WITHOUT COMPLICATION, UNSPECIFIED WHETHER LONG TERM INSULIN USE (HCC): ICD-10-CM

## 2021-04-14 DIAGNOSIS — R05.9 COUGH: ICD-10-CM

## 2021-04-14 DIAGNOSIS — E78.5 HYPERLIPIDEMIA, UNSPECIFIED HYPERLIPIDEMIA TYPE: ICD-10-CM

## 2021-04-14 PROCEDURE — 1111F DSCHRG MED/CURRENT MED MERGE: CPT | Performed by: FAMILY MEDICINE

## 2021-04-14 PROCEDURE — 99214 OFFICE O/P EST MOD 30 MIN: CPT | Performed by: FAMILY MEDICINE

## 2021-04-14 RX ORDER — BENZONATATE 100 MG/1
100 CAPSULE ORAL
COMMUNITY
Start: 2021-03-25 | End: 2021-04-14

## 2021-04-14 RX ORDER — DEXAMETHASONE 6 MG/1
TABLET ORAL
COMMUNITY
Start: 2021-03-30 | End: 2021-04-14 | Stop reason: ALTCHOICE

## 2021-04-14 RX ORDER — BENZONATATE 100 MG/1
100 CAPSULE ORAL 3 TIMES DAILY PRN
Qty: 30 CAPSULE | Refills: 1 | Status: SHIPPED | OUTPATIENT
Start: 2021-04-14 | End: 2021-06-25

## 2021-04-14 RX ORDER — AMOXICILLIN AND CLAVULANATE POTASSIUM 875; 125 MG/1; MG/1
1 TABLET, FILM COATED ORAL 2 TIMES DAILY
COMMUNITY
Start: 2021-03-25 | End: 2021-04-14 | Stop reason: ALTCHOICE

## 2021-04-15 NOTE — PROGRESS NOTES
CHIEF COMPLAINT: Patient presents with:  Hospital F/U        HPI:     Yue Crook is a 66year old male presents for lab results f-u and Covid f-u. Covid-19 infection f-u: Pt was Covid+ on 3/25/21.   He had just received the Covid vaccin HA, CP, palpitations, and no leg swelling. He is tolerating the Losartan and HCTZ both very well. Hyperlipidemia f-u: He contnues to take Atorvastatin as prescribed- he is tolerating well. He has no muscle aches and pains.             HISTORY:  Past Med fatigue. Negative for activity change, appetite change, chills, diaphoresis, fever and unexpected weight change. Eyes: Negative for visual disturbance. Respiratory: Positive for cough. Negative for chest tightness, shortness of breath and wheezing. Mood normal.         Behavior: Behavior normal.          LABS     [facility] Lab Encounter on 02/15/2021   Component Date Value   • Glucose 02/15/2021 126*   • Sodium 02/15/2021 138    • Potassium 02/15/2021 3.9    • Chloride 02/15/2021 104    • CO2 02/15/2021 30 complete fasting  labs prior to visit)    - COMP METABOLIC PANEL (14); Future  - LIPID PANEL; Future    4.  Hyperlipidemia, unspecified hyperlipidemia type  - 2/2021 lipids at goal    - cont Atorvastatin 20 mg daily  - d/w pt a healthy, low-fat/low-choleste List:     Benign essential hypertension     Hyperlipidemia     Type 2 diabetes mellitus without complication (HCC)     HTN (hypertension)      Imaging & Referrals:  None     4/14/2021  Lizette Yeboah MD      Patient understands plan and follow-up.

## 2021-06-06 DIAGNOSIS — E78.5 HYPERLIPIDEMIA, UNSPECIFIED HYPERLIPIDEMIA TYPE: ICD-10-CM

## 2021-06-07 RX ORDER — ATORVASTATIN CALCIUM 20 MG/1
TABLET, FILM COATED ORAL
Qty: 90 TABLET | Refills: 1 | Status: SHIPPED | OUTPATIENT
Start: 2021-06-07 | End: 2021-11-29

## 2021-06-07 NOTE — TELEPHONE ENCOUNTER
Pt requesting refill of   Requested Prescriptions     Pending Prescriptions Disp Refills   • atorvastatin 20 MG Oral Tab [Pharmacy Med Name: ATORVASTATIN 20 MG TABLET] 90 tablet 1     Sig: TAKE 1 TABLET BY MOUTH EVERY DAY     Passed protocol, refill appr

## 2021-06-25 ENCOUNTER — OFFICE VISIT (OUTPATIENT)
Dept: FAMILY MEDICINE CLINIC | Facility: CLINIC | Age: 71
End: 2021-06-25
Payer: MEDICARE

## 2021-06-25 VITALS
HEIGHT: 63 IN | RESPIRATION RATE: 16 BRPM | HEART RATE: 77 BPM | OXYGEN SATURATION: 96 % | SYSTOLIC BLOOD PRESSURE: 136 MMHG | DIASTOLIC BLOOD PRESSURE: 80 MMHG | BODY MASS INDEX: 27.85 KG/M2 | TEMPERATURE: 98 F | WEIGHT: 157.19 LBS

## 2021-06-25 DIAGNOSIS — R05.9 COUGH: Primary | ICD-10-CM

## 2021-06-25 PROCEDURE — 99213 OFFICE O/P EST LOW 20 MIN: CPT | Performed by: NURSE PRACTITIONER

## 2021-06-25 RX ORDER — BENZONATATE 200 MG/1
200 CAPSULE ORAL 3 TIMES DAILY PRN
Qty: 30 CAPSULE | Refills: 0 | Status: SHIPPED | OUTPATIENT
Start: 2021-06-25 | End: 2021-08-25 | Stop reason: ALTCHOICE

## 2021-06-25 NOTE — PROGRESS NOTES
HPI/Subjective:   Patient ID: Artemisa Angelucci is a 66year old male. Patient presents to the clinic today for evaluation of cough. He is accompanied by his wife. Patient with hx of covid infection that led to pneumonia in late March 2021. He is well-developed. Cardiovascular:      Rate and Rhythm: Normal rate and regular rhythm. Heart sounds: Normal heart sounds. Pulmonary:      Effort: Pulmonary effort is normal.      Breath sounds: Normal breath sounds.    Skin:     General: Skin

## 2021-06-25 NOTE — PATIENT INSTRUCTIONS
Please complete xray   Continue to take tessalon pearls  Warm liquids to help soothe the throat. ER precautions for chest pain, shortness of breath, nausea and vomiting, or worsening/concerning symptoms.

## 2021-07-04 DIAGNOSIS — I10 ESSENTIAL HYPERTENSION, BENIGN: ICD-10-CM

## 2021-07-06 RX ORDER — BENZONATATE 200 MG/1
CAPSULE ORAL
Qty: 30 CAPSULE | Refills: 0 | OUTPATIENT
Start: 2021-07-06

## 2021-07-06 NOTE — TELEPHONE ENCOUNTER
Patient is to follow-up with the office should symptoms persist or worsen. Tessalon Perles not meant for long-term use.

## 2021-07-06 NOTE — TELEPHONE ENCOUNTER
Pt requesting refill of   Requested Prescriptions     Pending Prescriptions Disp Refills   • BENZONATATE 200 MG Oral Cap [Pharmacy Med Name: BENZONATATE 200 MG CAPSULE] 30 capsule 0     Sig: TAKE 1 CAPSULE BY MOUTH EVERY DAY 3 TIMES A DAY AS NEEDED FOR COU

## 2021-07-06 NOTE — TELEPHONE ENCOUNTER
Pt requesting refill of   Requested Prescriptions     Pending Prescriptions Disp Refills   • HYDROCHLOROTHIAZIDE 25 MG Oral Tab [Pharmacy Med Name: HYDROCHLOROTHIAZIDE 25 MG TAB] 90 tablet 1     Sig: TAKE 1 TABLET BY MOUTH EVERY DAY   • LOSARTAN 100 MG O

## 2021-07-08 RX ORDER — LOSARTAN POTASSIUM 100 MG/1
TABLET ORAL
Qty: 90 TABLET | Refills: 1 | OUTPATIENT
Start: 2021-07-08

## 2021-07-08 RX ORDER — HYDROCHLOROTHIAZIDE 25 MG/1
TABLET ORAL
Qty: 90 TABLET | Refills: 1 | OUTPATIENT
Start: 2021-07-08

## 2021-07-13 ENCOUNTER — TELEPHONE (OUTPATIENT)
Dept: FAMILY MEDICINE CLINIC | Facility: CLINIC | Age: 71
End: 2021-07-13

## 2021-07-13 NOTE — TELEPHONE ENCOUNTER
Incoming fax received from [facility] [address]  Diabetic eye exam report 6/12/21 shows no diabetic retinopathy  Results abstracted and to PCP to review, initial, and send to scan

## 2021-08-02 ENCOUNTER — TELEPHONE (OUTPATIENT)
Dept: FAMILY MEDICINE CLINIC | Facility: CLINIC | Age: 71
End: 2021-08-02

## 2021-08-04 DIAGNOSIS — I10 ESSENTIAL HYPERTENSION, BENIGN: ICD-10-CM

## 2021-08-04 DIAGNOSIS — E11.9 TYPE 2 DIABETES MELLITUS WITHOUT COMPLICATION, UNSPECIFIED WHETHER LONG TERM INSULIN USE (HCC): ICD-10-CM

## 2021-08-04 NOTE — TELEPHONE ENCOUNTER
Pt requesting refill of   Requested Prescriptions     Pending Prescriptions Disp Refills   • METFORMIN  MG Oral Tab [Pharmacy Med Name: METFORMIN  MG TABLET] 180 tablet 1     Sig: TAKE 1 TABLET BY MOUTH TWICE A DAY WITH MEALS   • losartan 1

## 2021-08-08 DIAGNOSIS — I10 ESSENTIAL HYPERTENSION, BENIGN: ICD-10-CM

## 2021-08-09 RX ORDER — LOSARTAN POTASSIUM 100 MG/1
TABLET ORAL
Qty: 90 TABLET | Refills: 1 | OUTPATIENT
Start: 2021-08-09

## 2021-08-09 NOTE — TELEPHONE ENCOUNTER
LMOM to return call to the office as this is 2nd attempt to reach you. Provided pt office phone [phone] along with office hours.

## 2021-08-11 ENCOUNTER — LAB ENCOUNTER (OUTPATIENT)
Dept: LAB | Facility: HOSPITAL | Age: 71
End: 2021-08-11
Attending: FAMILY MEDICINE
Payer: MEDICARE

## 2021-08-11 DIAGNOSIS — E11.9 TYPE 2 DIABETES MELLITUS WITHOUT COMPLICATION, UNSPECIFIED WHETHER LONG TERM INSULIN USE (HCC): ICD-10-CM

## 2021-08-11 DIAGNOSIS — I10 ESSENTIAL HYPERTENSION, BENIGN: ICD-10-CM

## 2021-08-11 DIAGNOSIS — E78.5 HYPERLIPIDEMIA, UNSPECIFIED HYPERLIPIDEMIA TYPE: ICD-10-CM

## 2021-08-11 LAB
ALBUMIN SERPL-MCNC: 3.7 G/DL (ref 3.4–5)
ALBUMIN/GLOB SERPL: 0.9 {RATIO} (ref 1–2)
ALP LIVER SERPL-CCNC: 71 U/L
ALT SERPL-CCNC: 19 U/L
ANION GAP SERPL CALC-SCNC: 5 MMOL/L (ref 0–18)
AST SERPL-CCNC: 19 U/L (ref 15–37)
BILIRUB SERPL-MCNC: 0.6 MG/DL (ref 0.1–2)
BUN BLD-MCNC: 29 MG/DL (ref 7–18)
BUN/CREAT SERPL: 15.7 (ref 10–20)
CALCIUM BLD-MCNC: 9.3 MG/DL (ref 8.5–10.1)
CHLORIDE SERPL-SCNC: 105 MMOL/L (ref 98–112)
CHOLEST SMN-MCNC: 159 MG/DL (ref ?–200)
CO2 SERPL-SCNC: 29 MMOL/L (ref 21–32)
CREAT BLD-MCNC: 1.85 MG/DL
CREAT UR-SCNC: 127 MG/DL
EST. AVERAGE GLUCOSE BLD GHB EST-MCNC: 151 MG/DL (ref 68–126)
GLOBULIN PLAS-MCNC: 4.1 G/DL (ref 2.8–4.4)
GLUCOSE BLD-MCNC: 117 MG/DL (ref 70–99)
HBA1C MFR BLD HPLC: 6.9 % (ref ?–5.7)
HDLC SERPL-MCNC: 41 MG/DL (ref 40–59)
LDLC SERPL CALC-MCNC: 89 MG/DL (ref ?–100)
M PROTEIN MFR SERPL ELPH: 7.8 G/DL (ref 6.4–8.2)
MICROALBUMIN UR-MCNC: 3.18 MG/DL
MICROALBUMIN/CREAT 24H UR-RTO: 25 UG/MG (ref ?–30)
NONHDLC SERPL-MCNC: 118 MG/DL (ref ?–130)
OSMOLALITY SERPL CALC.SUM OF ELEC: 295 MOSM/KG (ref 275–295)
PATIENT FASTING Y/N/NP: YES
PATIENT FASTING Y/N/NP: YES
POTASSIUM SERPL-SCNC: 4.3 MMOL/L (ref 3.5–5.1)
SODIUM SERPL-SCNC: 139 MMOL/L (ref 136–145)
TRIGL SERPL-MCNC: 168 MG/DL (ref 30–149)
VLDLC SERPL CALC-MCNC: 27 MG/DL (ref 0–30)

## 2021-08-11 PROCEDURE — 83036 HEMOGLOBIN GLYCOSYLATED A1C: CPT

## 2021-08-11 PROCEDURE — 36415 COLL VENOUS BLD VENIPUNCTURE: CPT

## 2021-08-11 PROCEDURE — 82043 UR ALBUMIN QUANTITATIVE: CPT

## 2021-08-11 PROCEDURE — 82570 ASSAY OF URINE CREATININE: CPT

## 2021-08-11 PROCEDURE — 80061 LIPID PANEL: CPT

## 2021-08-11 PROCEDURE — 80053 COMPREHEN METABOLIC PANEL: CPT

## 2021-08-14 RX ORDER — LOSARTAN POTASSIUM 100 MG/1
100 TABLET ORAL DAILY
Qty: 90 TABLET | Refills: 1 | Status: SHIPPED | OUTPATIENT
Start: 2021-08-14 | End: 2022-02-07

## 2021-08-14 NOTE — PROGRESS NOTES
- diabetes A1c is slightly worse, now up to 6.9  - urine microalbumin now normal  - kidney function is slightly up, also showing dehydration, will monitor on next set of labs  - lipids with a borderline TG, cont statin therapy    MaKe f-u appt for sept or

## 2021-08-25 ENCOUNTER — OFFICE VISIT (OUTPATIENT)
Dept: FAMILY MEDICINE CLINIC | Facility: CLINIC | Age: 71
End: 2021-08-25
Payer: MEDICARE

## 2021-08-25 VITALS
SYSTOLIC BLOOD PRESSURE: 120 MMHG | RESPIRATION RATE: 18 BRPM | HEART RATE: 72 BPM | OXYGEN SATURATION: 96 % | WEIGHT: 157.63 LBS | HEIGHT: 63 IN | TEMPERATURE: 98 F | BODY MASS INDEX: 27.93 KG/M2 | DIASTOLIC BLOOD PRESSURE: 72 MMHG

## 2021-08-25 DIAGNOSIS — R05.9 COUGH: ICD-10-CM

## 2021-08-25 DIAGNOSIS — E11.9 TYPE 2 DIABETES MELLITUS WITHOUT COMPLICATION, UNSPECIFIED WHETHER LONG TERM INSULIN USE (HCC): ICD-10-CM

## 2021-08-25 DIAGNOSIS — R79.89 ELEVATED SERUM CREATININE: ICD-10-CM

## 2021-08-25 DIAGNOSIS — I10 ESSENTIAL HYPERTENSION, BENIGN: Primary | ICD-10-CM

## 2021-08-25 DIAGNOSIS — E78.5 HYPERLIPIDEMIA, UNSPECIFIED HYPERLIPIDEMIA TYPE: ICD-10-CM

## 2021-08-25 PROCEDURE — 99214 OFFICE O/P EST MOD 30 MIN: CPT | Performed by: FAMILY MEDICINE

## 2021-08-25 RX ORDER — BENZONATATE 200 MG/1
200 CAPSULE ORAL 3 TIMES DAILY PRN
Qty: 30 CAPSULE | Refills: 1 | Status: SHIPPED | OUTPATIENT
Start: 2021-08-25 | End: 2021-10-18

## 2021-08-25 RX ORDER — HYDROCHLOROTHIAZIDE 25 MG/1
25 TABLET ORAL DAILY
Qty: 90 TABLET | Refills: 1 | Status: SHIPPED | OUTPATIENT
Start: 2021-08-25

## 2021-08-27 NOTE — PROGRESS NOTES
CHIEF COMPLAINT: Patient presents with:  Diabetes        HPI:     Flower Rios is a 66year old male presents for lab results f-u. Diabetes f-u: His most recent A1c is 6.9.   He thinks his A1c went up because of eating a lot of grapes and Maternal Grandmother    • No Known Problems Maternal Grandfather    • No Known Problems Paternal Grandmother    • No Known Problems Paternal Grandfather    • No Known Problems Sister    • No Known Problems Brother    • No Known Problems Brother       Elysejaimee Guilherme Exam  Vitals reviewed. Constitutional:       Appearance: Normal appearance. HENT:      Head: Normocephalic. Cardiovascular:      Rate and Rhythm: Normal rate and regular rhythm. Pulses: Normal pulses. Heart sounds: Normal heart sounds.  No m • A/G Ratio 08/11/2021 0.9*   • FASTING 08/11/2021 Yes    • Cholesterol, Total 08/11/2021 159    • HDL Cholesterol 08/11/2021 41    • Triglycerides 08/11/2021 168*   • LDL Cholesterol 08/11/2021 89    • VLDL 08/11/2021 27    • Non HDL Chol 08/11/2021 118 Sig: Take 1 capsule (200 mg total) by mouth 3 (three) times daily as needed for cough.        Health Maintenance:  Zoster Vaccines(1 of 2) Never done  PSA due on 06/20/2021  Fall Risk Screening due on 09/17/2021  Annual Depression Screen due on 09/17/20

## 2021-09-29 ENCOUNTER — LAB ENCOUNTER (OUTPATIENT)
Dept: LAB | Facility: HOSPITAL | Age: 71
End: 2021-09-29
Attending: FAMILY MEDICINE
Payer: MEDICARE

## 2021-09-29 DIAGNOSIS — E11.9 TYPE 2 DIABETES MELLITUS WITHOUT COMPLICATION, UNSPECIFIED WHETHER LONG TERM INSULIN USE (HCC): ICD-10-CM

## 2021-09-29 PROCEDURE — 36415 COLL VENOUS BLD VENIPUNCTURE: CPT

## 2021-09-29 PROCEDURE — 80048 BASIC METABOLIC PNL TOTAL CA: CPT

## 2021-09-29 PROCEDURE — 83036 HEMOGLOBIN GLYCOSYLATED A1C: CPT

## 2021-09-30 ENCOUNTER — TELEPHONE (OUTPATIENT)
Dept: FAMILY MEDICINE CLINIC | Facility: CLINIC | Age: 71
End: 2021-09-30

## 2021-09-30 DIAGNOSIS — E11.9 TYPE 2 DIABETES MELLITUS WITHOUT COMPLICATION, UNSPECIFIED WHETHER LONG TERM INSULIN USE (HCC): ICD-10-CM

## 2021-09-30 DIAGNOSIS — E78.5 HYPERLIPIDEMIA, UNSPECIFIED HYPERLIPIDEMIA TYPE: ICD-10-CM

## 2021-09-30 DIAGNOSIS — I10 ESSENTIAL HYPERTENSION, BENIGN: Primary | ICD-10-CM

## 2021-10-18 ENCOUNTER — OFFICE VISIT (OUTPATIENT)
Dept: FAMILY MEDICINE CLINIC | Facility: CLINIC | Age: 71
End: 2021-10-18
Payer: MEDICARE

## 2021-10-18 VITALS
SYSTOLIC BLOOD PRESSURE: 120 MMHG | TEMPERATURE: 98 F | RESPIRATION RATE: 18 BRPM | BODY MASS INDEX: 27.96 KG/M2 | HEART RATE: 74 BPM | HEIGHT: 63 IN | WEIGHT: 157.81 LBS | OXYGEN SATURATION: 94 % | DIASTOLIC BLOOD PRESSURE: 74 MMHG

## 2021-10-18 DIAGNOSIS — E78.5 HYPERLIPIDEMIA, UNSPECIFIED HYPERLIPIDEMIA TYPE: ICD-10-CM

## 2021-10-18 DIAGNOSIS — Z00.00 ENCOUNTER FOR ROUTINE ADULT HEALTH EXAMINATION WITHOUT ABNORMAL FINDINGS: Primary | ICD-10-CM

## 2021-10-18 DIAGNOSIS — E11.9 TYPE 2 DIABETES MELLITUS WITHOUT COMPLICATION, UNSPECIFIED WHETHER LONG TERM INSULIN USE (HCC): ICD-10-CM

## 2021-10-18 DIAGNOSIS — R05.9 COUGH: ICD-10-CM

## 2021-10-18 DIAGNOSIS — I10 ESSENTIAL HYPERTENSION, BENIGN: ICD-10-CM

## 2021-10-18 DIAGNOSIS — Z12.5 PROSTATE CANCER SCREENING: ICD-10-CM

## 2021-10-18 PROCEDURE — G0439 PPPS, SUBSEQ VISIT: HCPCS | Performed by: FAMILY MEDICINE

## 2021-10-18 PROCEDURE — 36415 COLL VENOUS BLD VENIPUNCTURE: CPT | Performed by: FAMILY MEDICINE

## 2021-10-18 PROCEDURE — 99214 OFFICE O/P EST MOD 30 MIN: CPT | Performed by: FAMILY MEDICINE

## 2021-10-18 RX ORDER — BENZONATATE 200 MG/1
200 CAPSULE ORAL 3 TIMES DAILY PRN
Qty: 30 CAPSULE | Refills: 3 | Status: SHIPPED | OUTPATIENT
Start: 2021-10-18

## 2021-10-18 NOTE — PROGRESS NOTES
Patient came in for draw of ordered labs. Patient drawn out of R AC, x 1 attempt and tolerated well. 1 lt grn tube drawn. ABN Triggered for PSA level.  Pt signed Wendy Erps

## 2021-10-19 NOTE — PROGRESS NOTES
REASON FOR VISIT:    Nishant Etienne is a 66year old male who presents for a Medicare Annual Wellness visit. Annual physical:  Overall, pt states he feels well.      Sexually active:monogamous with wife  Last PSA: 6/2019, normal  Last colon mg total) by mouth daily.  90 tablet 1   • atorvastatin 20 MG Oral Tab TAKE 1 TABLET BY MOUTH EVERY DAY 90 tablet 1       Glucose and HbA1c Latest Ref Rng & Units 9/29/2021 8/11/2021 2/15/2021 11/4/2020 5/19/2020 10/15/2019 5/31/2019   Glucose 70 - 99 mg/dL meals: Able without help  Managing money/bills: Able without help  Taking medications as prescribed: Able without help  Are you able to afford your medications?: Yes  Hearing Problems?: No     Functional Status     Hearing Problems?: No  Vision Problems? : Date Value   09/29/2021 0.93       Digoxin Serum Conc  Annually No results found for: DIGOXIN     Diabetes     HgbA1C  Annually HgbA1C (%)   Date Value   09/29/2021 6.9 (H)       Creat/alb ratio  Annually Creatinine (mg/dL)   Date Value   09/29/2021 0.93 otherwise  SKIN: denies any unusual skin lesions  EYES: denies blurred vision or double vision  HEENT: denies nasal congestion, sinus pain or ST  LUNGS: denies shortness of breath with exertion  CARDIOVASCULAR: denies chest pain on exertion  GI: denies abd UTD     Last PSA: 6/2019, normal  Last colonoscopy: 8/2017, rpt in 5 yrs     .    Type 2 diabetes mellitus without complication, unspecified whether long term insulin use (HCC)  - last A1c in 9/2021 : 6.9  - last microalbumin: 2/2021 (slightly elevated)  -

## 2021-11-27 DIAGNOSIS — E78.5 HYPERLIPIDEMIA, UNSPECIFIED HYPERLIPIDEMIA TYPE: ICD-10-CM

## 2021-11-29 RX ORDER — ATORVASTATIN CALCIUM 20 MG/1
TABLET, FILM COATED ORAL
Qty: 90 TABLET | Refills: 1 | Status: SHIPPED | OUTPATIENT
Start: 2021-11-29

## 2021-11-29 NOTE — TELEPHONE ENCOUNTER
Refill Passed Protocol:     Pt requesting refill of   Requested Prescriptions     Pending Prescriptions Disp Refills   • ATORVASTATIN 20 MG Oral Tab [Pharmacy Med Name: ATORVASTATIN 20 MG TABLET] 90 tablet 1     Sig: TAKE 1 TABLET BY MOUTH EVERY DAY

## 2022-02-07 RX ORDER — LOSARTAN POTASSIUM 100 MG/1
TABLET ORAL
Qty: 90 TABLET | Refills: 1 | Status: SHIPPED | OUTPATIENT
Start: 2022-02-07

## 2022-03-01 RX ORDER — HYDROCHLOROTHIAZIDE 25 MG/1
25 TABLET ORAL DAILY
Qty: 90 TABLET | Refills: 1 | Status: SHIPPED | OUTPATIENT
Start: 2022-03-01

## 2022-03-29 ENCOUNTER — LAB ENCOUNTER (OUTPATIENT)
Dept: LAB | Facility: HOSPITAL | Age: 72
End: 2022-03-29
Attending: FAMILY MEDICINE
Payer: MEDICARE

## 2022-03-29 DIAGNOSIS — E78.5 HYPERLIPIDEMIA, UNSPECIFIED HYPERLIPIDEMIA TYPE: ICD-10-CM

## 2022-03-29 DIAGNOSIS — I10 ESSENTIAL HYPERTENSION, BENIGN: ICD-10-CM

## 2022-03-29 DIAGNOSIS — E11.9 TYPE 2 DIABETES MELLITUS WITHOUT COMPLICATION, UNSPECIFIED WHETHER LONG TERM INSULIN USE (HCC): ICD-10-CM

## 2022-03-29 LAB
ALBUMIN SERPL-MCNC: 3.8 G/DL (ref 3.4–5)
ALBUMIN/GLOB SERPL: 1 {RATIO} (ref 1–2)
ALP LIVER SERPL-CCNC: 74 U/L
ALT SERPL-CCNC: 23 U/L
ANION GAP SERPL CALC-SCNC: 4 MMOL/L (ref 0–18)
AST SERPL-CCNC: 23 U/L (ref 15–37)
BILIRUB SERPL-MCNC: 0.6 MG/DL (ref 0.1–2)
BUN BLD-MCNC: 25 MG/DL (ref 7–18)
BUN/CREAT SERPL: 22.3 (ref 10–20)
CALCIUM BLD-MCNC: 9.7 MG/DL (ref 8.5–10.1)
CHLORIDE SERPL-SCNC: 101 MMOL/L (ref 98–112)
CHOLEST SERPL-MCNC: 189 MG/DL (ref ?–200)
CO2 SERPL-SCNC: 31 MMOL/L (ref 21–32)
CREAT BLD-MCNC: 1.12 MG/DL
CREAT UR-SCNC: 83.2 MG/DL
EST. AVERAGE GLUCOSE BLD GHB EST-MCNC: 157 MG/DL (ref 68–126)
FASTING PATIENT LIPID ANSWER: YES
FASTING STATUS PATIENT QL REPORTED: YES
GLOBULIN PLAS-MCNC: 3.9 G/DL (ref 2.8–4.4)
GLUCOSE BLD-MCNC: 113 MG/DL (ref 70–99)
HBA1C MFR BLD: 7.1 % (ref ?–5.7)
HDLC SERPL-MCNC: 46 MG/DL (ref 40–59)
LDLC SERPL CALC-MCNC: 123 MG/DL (ref ?–100)
MICROALBUMIN UR-MCNC: 0.57 MG/DL
MICROALBUMIN/CREAT 24H UR-RTO: 6.9 UG/MG (ref ?–30)
NONHDLC SERPL-MCNC: 143 MG/DL (ref ?–130)
OSMOLALITY SERPL CALC.SUM OF ELEC: 287 MOSM/KG (ref 275–295)
POTASSIUM SERPL-SCNC: 4.8 MMOL/L (ref 3.5–5.1)
TRIGL SERPL-MCNC: 109 MG/DL (ref 30–149)
VLDLC SERPL CALC-MCNC: 19 MG/DL (ref 0–30)

## 2022-03-29 PROCEDURE — 83036 HEMOGLOBIN GLYCOSYLATED A1C: CPT

## 2022-03-29 PROCEDURE — 82043 UR ALBUMIN QUANTITATIVE: CPT

## 2022-03-29 PROCEDURE — 80061 LIPID PANEL: CPT

## 2022-03-29 PROCEDURE — 80053 COMPREHEN METABOLIC PANEL: CPT

## 2022-03-29 PROCEDURE — 82570 ASSAY OF URINE CREATININE: CPT

## 2022-03-29 PROCEDURE — 36415 COLL VENOUS BLD VENIPUNCTURE: CPT

## 2022-04-20 ENCOUNTER — OFFICE VISIT (OUTPATIENT)
Dept: FAMILY MEDICINE CLINIC | Facility: CLINIC | Age: 72
End: 2022-04-20
Payer: MEDICARE

## 2022-04-20 VITALS
WEIGHT: 159 LBS | SYSTOLIC BLOOD PRESSURE: 121 MMHG | RESPIRATION RATE: 16 BRPM | TEMPERATURE: 97 F | HEART RATE: 80 BPM | BODY MASS INDEX: 28.17 KG/M2 | OXYGEN SATURATION: 97 % | HEIGHT: 63 IN | DIASTOLIC BLOOD PRESSURE: 76 MMHG

## 2022-04-20 DIAGNOSIS — E78.5 HYPERLIPIDEMIA, UNSPECIFIED HYPERLIPIDEMIA TYPE: ICD-10-CM

## 2022-04-20 DIAGNOSIS — I10 ESSENTIAL HYPERTENSION, BENIGN: ICD-10-CM

## 2022-04-20 DIAGNOSIS — E11.9 TYPE 2 DIABETES MELLITUS WITHOUT COMPLICATION, UNSPECIFIED WHETHER LONG TERM INSULIN USE (HCC): Primary | ICD-10-CM

## 2022-04-20 DIAGNOSIS — Z23 NEED FOR VACCINATION: ICD-10-CM

## 2022-04-20 DIAGNOSIS — R05.9 COUGH: ICD-10-CM

## 2022-04-20 PROCEDURE — 99214 OFFICE O/P EST MOD 30 MIN: CPT | Performed by: FAMILY MEDICINE

## 2022-04-20 RX ORDER — BENZONATATE 200 MG/1
200 CAPSULE ORAL 3 TIMES DAILY PRN
Qty: 30 CAPSULE | Refills: 3 | Status: SHIPPED | OUTPATIENT
Start: 2022-04-20

## 2022-05-03 ENCOUNTER — TELEPHONE (OUTPATIENT)
Dept: FAMILY MEDICINE CLINIC | Facility: CLINIC | Age: 72
End: 2022-05-03

## 2022-05-21 DIAGNOSIS — E11.9 TYPE 2 DIABETES MELLITUS WITHOUT COMPLICATION, UNSPECIFIED WHETHER LONG TERM INSULIN USE (HCC): ICD-10-CM

## 2022-05-21 DIAGNOSIS — I10 ESSENTIAL HYPERTENSION, BENIGN: ICD-10-CM

## 2022-05-23 RX ORDER — LOSARTAN POTASSIUM 100 MG/1
TABLET ORAL
Qty: 90 TABLET | Refills: 1 | Status: SHIPPED | OUTPATIENT
Start: 2022-05-23

## 2022-05-25 DIAGNOSIS — E78.5 HYPERLIPIDEMIA, UNSPECIFIED HYPERLIPIDEMIA TYPE: ICD-10-CM

## 2022-05-25 RX ORDER — ATORVASTATIN CALCIUM 20 MG/1
TABLET, FILM COATED ORAL
Qty: 90 TABLET | Refills: 1 | Status: SHIPPED | OUTPATIENT
Start: 2022-05-25

## 2022-08-11 DIAGNOSIS — I10 ESSENTIAL HYPERTENSION, BENIGN: ICD-10-CM

## 2022-08-11 RX ORDER — HYDROCHLOROTHIAZIDE 25 MG/1
25 TABLET ORAL DAILY
Qty: 90 TABLET | Refills: 0 | Status: SHIPPED | OUTPATIENT
Start: 2022-08-11

## 2022-09-06 ENCOUNTER — LAB ENCOUNTER (OUTPATIENT)
Dept: LAB | Facility: HOSPITAL | Age: 72
End: 2022-09-06
Attending: FAMILY MEDICINE
Payer: MEDICARE

## 2022-09-06 DIAGNOSIS — E78.5 HYPERLIPIDEMIA, UNSPECIFIED HYPERLIPIDEMIA TYPE: ICD-10-CM

## 2022-09-06 DIAGNOSIS — E11.9 TYPE 2 DIABETES MELLITUS WITHOUT COMPLICATION, UNSPECIFIED WHETHER LONG TERM INSULIN USE (HCC): ICD-10-CM

## 2022-09-06 DIAGNOSIS — I10 ESSENTIAL HYPERTENSION, BENIGN: ICD-10-CM

## 2022-09-06 LAB
ALBUMIN SERPL-MCNC: 3.9 G/DL (ref 3.4–5)
ALBUMIN/GLOB SERPL: 1 {RATIO} (ref 1–2)
ALP LIVER SERPL-CCNC: 74 U/L
ALT SERPL-CCNC: 17 U/L
ANION GAP SERPL CALC-SCNC: 7 MMOL/L (ref 0–18)
AST SERPL-CCNC: 20 U/L (ref 15–37)
BILIRUB SERPL-MCNC: 0.6 MG/DL (ref 0.1–2)
BUN BLD-MCNC: 24 MG/DL (ref 7–18)
BUN/CREAT SERPL: 23.3 (ref 10–20)
CALCIUM BLD-MCNC: 9.5 MG/DL (ref 8.5–10.1)
CHLORIDE SERPL-SCNC: 103 MMOL/L (ref 98–112)
CHOLEST SERPL-MCNC: 163 MG/DL (ref ?–200)
CO2 SERPL-SCNC: 28 MMOL/L (ref 21–32)
CREAT BLD-MCNC: 1.03 MG/DL
EST. AVERAGE GLUCOSE BLD GHB EST-MCNC: 151 MG/DL (ref 68–126)
FASTING PATIENT LIPID ANSWER: YES
FASTING STATUS PATIENT QL REPORTED: YES
GFR SERPLBLD BASED ON 1.73 SQ M-ARVRAT: 77 ML/MIN/1.73M2 (ref 60–?)
GLOBULIN PLAS-MCNC: 4 G/DL (ref 2.8–4.4)
GLUCOSE BLD-MCNC: 103 MG/DL (ref 70–99)
HBA1C MFR BLD: 6.9 % (ref ?–5.7)
HDLC SERPL-MCNC: 40 MG/DL (ref 40–59)
LDLC SERPL CALC-MCNC: 94 MG/DL (ref ?–100)
NONHDLC SERPL-MCNC: 123 MG/DL (ref ?–130)
OSMOLALITY SERPL CALC.SUM OF ELEC: 290 MOSM/KG (ref 275–295)
POTASSIUM SERPL-SCNC: 4.1 MMOL/L (ref 3.5–5.1)
PROT SERPL-MCNC: 7.9 G/DL (ref 6.4–8.2)
SODIUM SERPL-SCNC: 138 MMOL/L (ref 136–145)
TRIGL SERPL-MCNC: 169 MG/DL (ref 30–149)
VLDLC SERPL CALC-MCNC: 28 MG/DL (ref 0–30)

## 2022-09-06 PROCEDURE — 36415 COLL VENOUS BLD VENIPUNCTURE: CPT

## 2022-09-06 PROCEDURE — 83036 HEMOGLOBIN GLYCOSYLATED A1C: CPT

## 2022-09-06 PROCEDURE — 80053 COMPREHEN METABOLIC PANEL: CPT

## 2022-09-06 PROCEDURE — 80061 LIPID PANEL: CPT

## 2022-09-28 ENCOUNTER — OFFICE VISIT (OUTPATIENT)
Dept: FAMILY MEDICINE CLINIC | Facility: CLINIC | Age: 72
End: 2022-09-28

## 2022-09-28 VITALS
HEART RATE: 65 BPM | WEIGHT: 162.63 LBS | TEMPERATURE: 99 F | OXYGEN SATURATION: 98 % | RESPIRATION RATE: 18 BRPM | SYSTOLIC BLOOD PRESSURE: 124 MMHG | DIASTOLIC BLOOD PRESSURE: 60 MMHG | BODY MASS INDEX: 29 KG/M2

## 2022-09-28 DIAGNOSIS — E11.9 TYPE 2 DIABETES MELLITUS WITHOUT COMPLICATION, UNSPECIFIED WHETHER LONG TERM INSULIN USE (HCC): Primary | ICD-10-CM

## 2022-09-28 DIAGNOSIS — E78.5 HYPERLIPIDEMIA, UNSPECIFIED HYPERLIPIDEMIA TYPE: ICD-10-CM

## 2022-09-28 DIAGNOSIS — I10 ESSENTIAL HYPERTENSION, BENIGN: ICD-10-CM

## 2022-09-28 PROCEDURE — 99214 OFFICE O/P EST MOD 30 MIN: CPT | Performed by: FAMILY MEDICINE

## 2022-09-28 PROCEDURE — 1126F AMNT PAIN NOTED NONE PRSNT: CPT | Performed by: FAMILY MEDICINE

## 2022-10-06 ENCOUNTER — MED REC SCAN ONLY (OUTPATIENT)
Dept: FAMILY MEDICINE CLINIC | Facility: CLINIC | Age: 72
End: 2022-10-06

## 2022-11-07 DIAGNOSIS — I10 ESSENTIAL HYPERTENSION, BENIGN: ICD-10-CM

## 2022-11-07 RX ORDER — HYDROCHLOROTHIAZIDE 25 MG/1
25 TABLET ORAL DAILY
Qty: 90 TABLET | Refills: 0 | Status: SHIPPED | OUTPATIENT
Start: 2022-11-07

## 2022-11-28 DIAGNOSIS — E78.5 HYPERLIPIDEMIA, UNSPECIFIED HYPERLIPIDEMIA TYPE: ICD-10-CM

## 2022-11-29 RX ORDER — ATORVASTATIN CALCIUM 20 MG/1
TABLET, FILM COATED ORAL
Qty: 90 TABLET | Refills: 1 | Status: SHIPPED | OUTPATIENT
Start: 2022-11-29

## 2023-01-11 DIAGNOSIS — I10 ESSENTIAL HYPERTENSION, BENIGN: ICD-10-CM

## 2023-01-11 RX ORDER — LOSARTAN POTASSIUM 100 MG/1
TABLET ORAL
Qty: 90 TABLET | Refills: 1 | Status: SHIPPED | OUTPATIENT
Start: 2023-01-11

## 2023-02-03 DIAGNOSIS — I10 ESSENTIAL HYPERTENSION, BENIGN: ICD-10-CM

## 2023-02-03 RX ORDER — HYDROCHLOROTHIAZIDE 25 MG/1
25 TABLET ORAL DAILY
Qty: 90 TABLET | Refills: 0 | Status: SHIPPED | OUTPATIENT
Start: 2023-02-03

## 2023-02-20 ENCOUNTER — LAB ENCOUNTER (OUTPATIENT)
Dept: LAB | Facility: HOSPITAL | Age: 73
End: 2023-02-20
Attending: FAMILY MEDICINE
Payer: MEDICARE

## 2023-02-20 DIAGNOSIS — I10 ESSENTIAL HYPERTENSION, BENIGN: ICD-10-CM

## 2023-02-20 DIAGNOSIS — E11.9 TYPE 2 DIABETES MELLITUS WITHOUT COMPLICATION, UNSPECIFIED WHETHER LONG TERM INSULIN USE (HCC): ICD-10-CM

## 2023-02-20 DIAGNOSIS — E78.5 HYPERLIPIDEMIA, UNSPECIFIED HYPERLIPIDEMIA TYPE: ICD-10-CM

## 2023-02-20 LAB
ALBUMIN SERPL-MCNC: 4 G/DL (ref 3.4–5)
ALBUMIN/GLOB SERPL: 1.1 {RATIO} (ref 1–2)
ALP LIVER SERPL-CCNC: 67 U/L
ALT SERPL-CCNC: 20 U/L
ANION GAP SERPL CALC-SCNC: 5 MMOL/L (ref 0–18)
AST SERPL-CCNC: 22 U/L (ref 15–37)
BILIRUB SERPL-MCNC: 0.7 MG/DL (ref 0.1–2)
BUN BLD-MCNC: 21 MG/DL (ref 7–18)
BUN/CREAT SERPL: 18.6 (ref 10–20)
CALCIUM BLD-MCNC: 9.8 MG/DL (ref 8.5–10.1)
CHLORIDE SERPL-SCNC: 100 MMOL/L (ref 98–112)
CHOLEST SERPL-MCNC: 148 MG/DL (ref ?–200)
CO2 SERPL-SCNC: 32 MMOL/L (ref 21–32)
CREAT BLD-MCNC: 1.13 MG/DL
CREAT UR-SCNC: 131 MG/DL
EST. AVERAGE GLUCOSE BLD GHB EST-MCNC: 151 MG/DL (ref 68–126)
FASTING PATIENT LIPID ANSWER: YES
FASTING STATUS PATIENT QL REPORTED: YES
GFR SERPLBLD BASED ON 1.73 SQ M-ARVRAT: 69 ML/MIN/1.73M2 (ref 60–?)
GLOBULIN PLAS-MCNC: 3.8 G/DL (ref 2.8–4.4)
GLUCOSE BLD-MCNC: 127 MG/DL (ref 70–99)
HBA1C MFR BLD: 6.9 % (ref ?–5.7)
HDLC SERPL-MCNC: 43 MG/DL (ref 40–59)
LDLC SERPL CALC-MCNC: 82 MG/DL (ref ?–100)
MICROALBUMIN UR-MCNC: 1.2 MG/DL
MICROALBUMIN/CREAT 24H UR-RTO: 9.2 UG/MG (ref ?–30)
NONHDLC SERPL-MCNC: 105 MG/DL (ref ?–130)
OSMOLALITY SERPL CALC.SUM OF ELEC: 289 MOSM/KG (ref 275–295)
POTASSIUM SERPL-SCNC: 4.4 MMOL/L (ref 3.5–5.1)
PROT SERPL-MCNC: 7.8 G/DL (ref 6.4–8.2)
SODIUM SERPL-SCNC: 137 MMOL/L (ref 136–145)
TRIGL SERPL-MCNC: 131 MG/DL (ref 30–149)
VLDLC SERPL CALC-MCNC: 21 MG/DL (ref 0–30)

## 2023-02-20 PROCEDURE — 36415 COLL VENOUS BLD VENIPUNCTURE: CPT

## 2023-02-20 PROCEDURE — 83036 HEMOGLOBIN GLYCOSYLATED A1C: CPT

## 2023-02-20 PROCEDURE — 80053 COMPREHEN METABOLIC PANEL: CPT

## 2023-02-20 PROCEDURE — 82570 ASSAY OF URINE CREATININE: CPT

## 2023-02-20 PROCEDURE — 80061 LIPID PANEL: CPT

## 2023-02-20 PROCEDURE — 82043 UR ALBUMIN QUANTITATIVE: CPT

## 2023-03-17 ENCOUNTER — OFFICE VISIT (OUTPATIENT)
Dept: FAMILY MEDICINE CLINIC | Facility: CLINIC | Age: 73
End: 2023-03-17
Payer: MEDICARE

## 2023-03-17 VITALS
DIASTOLIC BLOOD PRESSURE: 58 MMHG | BODY MASS INDEX: 27.25 KG/M2 | SYSTOLIC BLOOD PRESSURE: 132 MMHG | RESPIRATION RATE: 18 BRPM | OXYGEN SATURATION: 97 % | HEART RATE: 71 BPM | TEMPERATURE: 97 F | WEIGHT: 159.63 LBS | HEIGHT: 63.98 IN

## 2023-03-17 DIAGNOSIS — I10 ESSENTIAL HYPERTENSION, BENIGN: ICD-10-CM

## 2023-03-17 DIAGNOSIS — E78.5 HYPERLIPIDEMIA, UNSPECIFIED HYPERLIPIDEMIA TYPE: ICD-10-CM

## 2023-03-17 DIAGNOSIS — Z00.00 ANNUAL PHYSICAL EXAM: Primary | ICD-10-CM

## 2023-03-17 DIAGNOSIS — Z12.5 PROSTATE CANCER SCREENING: ICD-10-CM

## 2023-03-17 DIAGNOSIS — E11.9 TYPE 2 DIABETES MELLITUS WITHOUT COMPLICATION, UNSPECIFIED WHETHER LONG TERM INSULIN USE (HCC): ICD-10-CM

## 2023-03-17 DIAGNOSIS — Z12.11 SCREEN FOR COLON CANCER: ICD-10-CM

## 2023-03-17 PROCEDURE — G0439 PPPS, SUBSEQ VISIT: HCPCS | Performed by: FAMILY MEDICINE

## 2023-03-17 PROCEDURE — 99214 OFFICE O/P EST MOD 30 MIN: CPT | Performed by: FAMILY MEDICINE

## 2023-03-17 PROCEDURE — 1126F AMNT PAIN NOTED NONE PRSNT: CPT | Performed by: FAMILY MEDICINE

## 2023-03-29 ENCOUNTER — APPOINTMENT (OUTPATIENT)
Dept: LAB | Age: 73
End: 2023-03-29
Attending: FAMILY MEDICINE
Payer: MEDICARE

## 2023-04-12 DIAGNOSIS — E11.9 TYPE 2 DIABETES MELLITUS WITHOUT COMPLICATION, UNSPECIFIED WHETHER LONG TERM INSULIN USE (HCC): ICD-10-CM

## 2023-05-05 DIAGNOSIS — I10 ESSENTIAL HYPERTENSION, BENIGN: ICD-10-CM

## 2023-05-05 RX ORDER — HYDROCHLOROTHIAZIDE 25 MG/1
25 TABLET ORAL DAILY
Qty: 90 TABLET | Refills: 0 | Status: SHIPPED | OUTPATIENT
Start: 2023-05-05

## 2023-05-22 DIAGNOSIS — E78.5 HYPERLIPIDEMIA, UNSPECIFIED HYPERLIPIDEMIA TYPE: ICD-10-CM

## 2023-05-22 RX ORDER — ATORVASTATIN CALCIUM 20 MG/1
TABLET, FILM COATED ORAL
Qty: 90 TABLET | Refills: 1 | Status: SHIPPED | OUTPATIENT
Start: 2023-05-22

## 2023-07-08 DIAGNOSIS — I10 ESSENTIAL HYPERTENSION, BENIGN: ICD-10-CM

## 2023-07-10 RX ORDER — LOSARTAN POTASSIUM 100 MG/1
TABLET ORAL
Qty: 90 TABLET | Refills: 1 | Status: SHIPPED | OUTPATIENT
Start: 2023-07-10

## 2023-07-10 NOTE — TELEPHONE ENCOUNTER
Refill Passed Protocol:     Pt requesting refill of   Requested Prescriptions     Pending Prescriptions Disp Refills    LOSARTAN 100 MG Oral Tab [Pharmacy Med Name: LOSARTAN POTASSIUM 100 MG TAB] 90 tablet 1     Sig: TAKE 1 TABLET BY MOUTH EVERY DAY       Refill was approved and sent to pharmacy:     Last Time Medication was Filled: 1/11/2023    Last Office Visit with Provider: 3/17/2023    Appt. scheduled on 9/27/2023

## 2023-08-01 DIAGNOSIS — I10 ESSENTIAL HYPERTENSION, BENIGN: ICD-10-CM

## 2023-08-01 RX ORDER — HYDROCHLOROTHIAZIDE 25 MG/1
25 TABLET ORAL DAILY
Qty: 90 TABLET | Refills: 0 | Status: SHIPPED | OUTPATIENT
Start: 2023-08-01

## 2023-09-20 ENCOUNTER — LAB ENCOUNTER (OUTPATIENT)
Dept: LAB | Facility: HOSPITAL | Age: 73
End: 2023-09-20
Attending: FAMILY MEDICINE
Payer: MEDICARE

## 2023-09-20 DIAGNOSIS — I10 ESSENTIAL HYPERTENSION, BENIGN: ICD-10-CM

## 2023-09-20 DIAGNOSIS — Z12.5 PROSTATE CANCER SCREENING: ICD-10-CM

## 2023-09-20 DIAGNOSIS — E11.9 TYPE 2 DIABETES MELLITUS WITHOUT COMPLICATION, UNSPECIFIED WHETHER LONG TERM INSULIN USE (HCC): ICD-10-CM

## 2023-09-20 DIAGNOSIS — E78.5 HYPERLIPIDEMIA, UNSPECIFIED HYPERLIPIDEMIA TYPE: ICD-10-CM

## 2023-09-20 LAB
ALBUMIN SERPL-MCNC: 3.4 G/DL (ref 3.4–5)
ALBUMIN/GLOB SERPL: 0.8 {RATIO} (ref 1–2)
ALP LIVER SERPL-CCNC: 97 U/L
ALT SERPL-CCNC: 25 U/L
ANION GAP SERPL CALC-SCNC: 8 MMOL/L (ref 0–18)
AST SERPL-CCNC: 20 U/L (ref 15–37)
BILIRUB SERPL-MCNC: 0.6 MG/DL (ref 0.1–2)
BUN BLD-MCNC: 21 MG/DL (ref 7–18)
BUN/CREAT SERPL: 22.3 (ref 10–20)
CALCIUM BLD-MCNC: 9.5 MG/DL (ref 8.5–10.1)
CHLORIDE SERPL-SCNC: 106 MMOL/L (ref 98–112)
CHOLEST SERPL-MCNC: 159 MG/DL (ref ?–200)
CO2 SERPL-SCNC: 27 MMOL/L (ref 21–32)
COMPLEXED PSA SERPL-MCNC: 3.07 NG/ML (ref ?–4)
CREAT BLD-MCNC: 0.94 MG/DL
EGFRCR SERPLBLD CKD-EPI 2021: 85 ML/MIN/1.73M2 (ref 60–?)
EST. AVERAGE GLUCOSE BLD GHB EST-MCNC: 146 MG/DL (ref 68–126)
FASTING PATIENT LIPID ANSWER: YES
FASTING STATUS PATIENT QL REPORTED: YES
GLOBULIN PLAS-MCNC: 4.3 G/DL (ref 2.8–4.4)
GLUCOSE BLD-MCNC: 115 MG/DL (ref 70–99)
HBA1C MFR BLD: 6.7 % (ref ?–5.7)
HDLC SERPL-MCNC: 41 MG/DL (ref 40–59)
LDLC SERPL CALC-MCNC: 96 MG/DL (ref ?–100)
NONHDLC SERPL-MCNC: 118 MG/DL (ref ?–130)
OSMOLALITY SERPL CALC.SUM OF ELEC: 296 MOSM/KG (ref 275–295)
POTASSIUM SERPL-SCNC: 4.1 MMOL/L (ref 3.5–5.1)
PROT SERPL-MCNC: 7.7 G/DL (ref 6.4–8.2)
SODIUM SERPL-SCNC: 141 MMOL/L (ref 136–145)
TRIGL SERPL-MCNC: 125 MG/DL (ref 30–149)
VLDLC SERPL CALC-MCNC: 21 MG/DL (ref 0–30)

## 2023-09-20 PROCEDURE — 80053 COMPREHEN METABOLIC PANEL: CPT

## 2023-09-20 PROCEDURE — 80061 LIPID PANEL: CPT

## 2023-09-20 PROCEDURE — 36415 COLL VENOUS BLD VENIPUNCTURE: CPT

## 2023-09-20 PROCEDURE — 83036 HEMOGLOBIN GLYCOSYLATED A1C: CPT

## 2023-09-27 ENCOUNTER — OFFICE VISIT (OUTPATIENT)
Dept: FAMILY MEDICINE CLINIC | Facility: CLINIC | Age: 73
End: 2023-09-27
Payer: MEDICARE

## 2023-09-27 VITALS
SYSTOLIC BLOOD PRESSURE: 142 MMHG | TEMPERATURE: 99 F | BODY MASS INDEX: 26 KG/M2 | WEIGHT: 152.19 LBS | DIASTOLIC BLOOD PRESSURE: 70 MMHG | RESPIRATION RATE: 18 BRPM | HEART RATE: 75 BPM | OXYGEN SATURATION: 95 %

## 2023-09-27 DIAGNOSIS — I10 ESSENTIAL HYPERTENSION, BENIGN: Primary | ICD-10-CM

## 2023-09-27 DIAGNOSIS — E78.5 HYPERLIPIDEMIA, UNSPECIFIED HYPERLIPIDEMIA TYPE: ICD-10-CM

## 2023-09-27 DIAGNOSIS — E11.9 TYPE 2 DIABETES MELLITUS WITHOUT COMPLICATION, UNSPECIFIED WHETHER LONG TERM INSULIN USE (HCC): ICD-10-CM

## 2023-09-27 PROCEDURE — 99214 OFFICE O/P EST MOD 30 MIN: CPT | Performed by: FAMILY MEDICINE

## 2023-09-27 PROCEDURE — 1126F AMNT PAIN NOTED NONE PRSNT: CPT | Performed by: FAMILY MEDICINE

## 2023-10-05 DIAGNOSIS — E11.9 TYPE 2 DIABETES MELLITUS WITHOUT COMPLICATION, UNSPECIFIED WHETHER LONG TERM INSULIN USE (HCC): ICD-10-CM

## 2023-10-29 DIAGNOSIS — I10 ESSENTIAL HYPERTENSION, BENIGN: ICD-10-CM

## 2023-10-31 RX ORDER — HYDROCHLOROTHIAZIDE 25 MG/1
25 TABLET ORAL DAILY
Qty: 90 TABLET | Refills: 0 | Status: SHIPPED | OUTPATIENT
Start: 2023-10-31

## 2023-11-17 DIAGNOSIS — E78.5 HYPERLIPIDEMIA, UNSPECIFIED HYPERLIPIDEMIA TYPE: ICD-10-CM

## 2023-11-17 RX ORDER — ATORVASTATIN CALCIUM 20 MG/1
TABLET, FILM COATED ORAL
Qty: 90 TABLET | Refills: 1 | Status: SHIPPED | OUTPATIENT
Start: 2023-11-17

## 2023-12-31 DIAGNOSIS — I10 ESSENTIAL HYPERTENSION, BENIGN: ICD-10-CM

## 2024-01-02 RX ORDER — LOSARTAN POTASSIUM 100 MG/1
100 TABLET ORAL DAILY
Qty: 90 TABLET | Refills: 1 | Status: SHIPPED | OUTPATIENT
Start: 2024-01-02

## 2024-01-29 DIAGNOSIS — I10 ESSENTIAL HYPERTENSION, BENIGN: ICD-10-CM

## 2024-01-29 RX ORDER — HYDROCHLOROTHIAZIDE 25 MG/1
25 TABLET ORAL DAILY
Qty: 90 TABLET | Refills: 0 | Status: SHIPPED | OUTPATIENT
Start: 2024-01-29

## 2024-02-15 ENCOUNTER — LAB ENCOUNTER (OUTPATIENT)
Dept: LAB | Facility: HOSPITAL | Age: 74
End: 2024-02-15
Attending: FAMILY MEDICINE
Payer: MEDICARE

## 2024-02-15 DIAGNOSIS — I10 ESSENTIAL HYPERTENSION, BENIGN: ICD-10-CM

## 2024-02-15 DIAGNOSIS — E78.5 HYPERLIPIDEMIA, UNSPECIFIED HYPERLIPIDEMIA TYPE: ICD-10-CM

## 2024-02-15 DIAGNOSIS — E11.9 TYPE 2 DIABETES MELLITUS WITHOUT COMPLICATION, UNSPECIFIED WHETHER LONG TERM INSULIN USE (HCC): ICD-10-CM

## 2024-02-15 LAB
ALBUMIN SERPL-MCNC: 4.1 G/DL (ref 3.2–4.8)
ALBUMIN/GLOB SERPL: 1.2 {RATIO} (ref 1–2)
ALP LIVER SERPL-CCNC: 80 U/L
ALT SERPL-CCNC: 7 U/L
ANION GAP SERPL CALC-SCNC: 1 MMOL/L (ref 0–18)
AST SERPL-CCNC: 20 U/L (ref ?–34)
BILIRUB SERPL-MCNC: 0.8 MG/DL (ref 0.2–1.1)
BUN BLD-MCNC: 21 MG/DL (ref 9–23)
BUN/CREAT SERPL: 21.9 (ref 10–20)
CALCIUM BLD-MCNC: 9.6 MG/DL (ref 8.7–10.4)
CHLORIDE SERPL-SCNC: 104 MMOL/L (ref 98–112)
CHOLEST SERPL-MCNC: 152 MG/DL (ref ?–200)
CO2 SERPL-SCNC: 31 MMOL/L (ref 21–32)
CREAT BLD-MCNC: 0.96 MG/DL
CREAT UR-SCNC: 43 MG/DL
EGFRCR SERPLBLD CKD-EPI 2021: 83 ML/MIN/1.73M2 (ref 60–?)
EST. AVERAGE GLUCOSE BLD GHB EST-MCNC: 148 MG/DL (ref 68–126)
FASTING PATIENT LIPID ANSWER: YES
FASTING STATUS PATIENT QL REPORTED: YES
GLOBULIN PLAS-MCNC: 3.4 G/DL (ref 2.8–4.4)
GLUCOSE BLD-MCNC: 120 MG/DL (ref 70–99)
HBA1C MFR BLD: 6.8 % (ref ?–5.7)
HDLC SERPL-MCNC: 41 MG/DL (ref 40–59)
LDLC SERPL CALC-MCNC: 94 MG/DL (ref ?–100)
MICROALBUMIN UR-MCNC: <0.3 MG/DL
NONHDLC SERPL-MCNC: 111 MG/DL (ref ?–130)
OSMOLALITY SERPL CALC.SUM OF ELEC: 286 MOSM/KG (ref 275–295)
POTASSIUM SERPL-SCNC: 3.8 MMOL/L (ref 3.5–5.1)
PROT SERPL-MCNC: 7.5 G/DL (ref 5.7–8.2)
SODIUM SERPL-SCNC: 136 MMOL/L (ref 136–145)
TRIGL SERPL-MCNC: 88 MG/DL (ref 30–149)
VLDLC SERPL CALC-MCNC: 14 MG/DL (ref 0–30)

## 2024-02-15 PROCEDURE — 83036 HEMOGLOBIN GLYCOSYLATED A1C: CPT

## 2024-02-15 PROCEDURE — 80053 COMPREHEN METABOLIC PANEL: CPT

## 2024-02-15 PROCEDURE — 82570 ASSAY OF URINE CREATININE: CPT

## 2024-02-15 PROCEDURE — 80061 LIPID PANEL: CPT

## 2024-02-15 PROCEDURE — 36415 COLL VENOUS BLD VENIPUNCTURE: CPT

## 2024-02-15 PROCEDURE — 82043 UR ALBUMIN QUANTITATIVE: CPT

## 2024-03-21 ENCOUNTER — OFFICE VISIT (OUTPATIENT)
Dept: FAMILY MEDICINE CLINIC | Facility: CLINIC | Age: 74
End: 2024-03-21
Payer: MEDICARE

## 2024-03-21 VITALS
HEART RATE: 62 BPM | TEMPERATURE: 98 F | RESPIRATION RATE: 18 BRPM | BODY MASS INDEX: 26 KG/M2 | DIASTOLIC BLOOD PRESSURE: 72 MMHG | SYSTOLIC BLOOD PRESSURE: 134 MMHG | OXYGEN SATURATION: 98 % | WEIGHT: 149 LBS

## 2024-03-21 DIAGNOSIS — Z00.00 ANNUAL PHYSICAL EXAM: Primary | ICD-10-CM

## 2024-03-21 DIAGNOSIS — I10 ESSENTIAL HYPERTENSION, BENIGN: ICD-10-CM

## 2024-03-21 DIAGNOSIS — E78.5 HYPERLIPIDEMIA, UNSPECIFIED HYPERLIPIDEMIA TYPE: ICD-10-CM

## 2024-03-21 DIAGNOSIS — E11.9 TYPE 2 DIABETES MELLITUS WITHOUT COMPLICATION, UNSPECIFIED WHETHER LONG TERM INSULIN USE (HCC): ICD-10-CM

## 2024-03-21 DIAGNOSIS — Z12.11 SCREEN FOR COLON CANCER: ICD-10-CM

## 2024-03-21 RX ORDER — ATORVASTATIN CALCIUM 20 MG/1
TABLET, FILM COATED ORAL
Qty: 90 TABLET | Refills: 3 | Status: SHIPPED | OUTPATIENT
Start: 2024-03-21

## 2024-03-21 RX ORDER — HYDROCHLOROTHIAZIDE 25 MG/1
25 TABLET ORAL DAILY
Qty: 90 TABLET | Refills: 3 | Status: SHIPPED | OUTPATIENT
Start: 2024-03-21

## 2024-03-24 NOTE — PROGRESS NOTES
REASON FOR VISIT:    Anand Rincon is a 73 year old male who presents for a Medicare Annual Wellness visit.    Annual physical:  Overall, pt states he feels well.      Sexually active:monogamous with wife  Last PSA: 09/2023, normal  Last colonoscopy: overdue, amenable to Cologuard      Exercise:walking most of the day, around yard and doing housework  Diet: regular diet tries to be healthy       Diabetes f-u: Pt A1c is 6.8 in 3/2024 . He takes Metformin daily as Rx'd.  He has no polyuria and no polydipsia. He saw Dr. Jarvis in 5/2023 for his diabetic eye exam.      Previous HPI from 3/2023: 2/2023 A1c was 6.9.  He feels well- has has no polyuria and no polydipsia. He is taking Metformin BIDWM, tolerating well without any issues.He completed his diabetic eye exam in 5/2022with Monica Jarvis.     Previous HPI: Most recent A1c in 9/2022 is 6.9. He is taking Metformin BIDWM, tolerating well without any issues. He has no polyuria and no polydipsia. He had his last diabetic eye exam with Dr. Jarvis on 2022.       Previous HPI from 4/2022: Recent A1c is 7.1. He thinks the increase is due to decreased physical activity and exercise in the winter.  He is taking the Metformin BIDWM, tolerating well.  He has no polyuria and no polydipsia. He will be coming back to [place] for his next eye exam with Dr. Jarvis.      Previous HPI from 10/2021: His last A1c in 9/2021 was 6.9. He feels well, no polyuria and no polydipsia.  He is taking Metformin BIDWM.  He had his diabetic eye exam with Dr. Jarvis in 7/2021.       HTN f-u:  He is taking Losartan and hydrochlorothiazide, no issues.  He does not have HA, vision changes, no CP, palpitations, and no leg swelling.     Previous HPI from 10/2021:He continues to take hydrochlorothiazide and Losartan, tolerating well. He has no HA, no CP, palpitations, and no leg swelling.      Hyperlipidemia f-u: He is taking Atorvastatin, tolerating well.                      Patient Care Team:  Patient Care Team:  Ember Chow MD as PCP - General (Family Medicine)    Patient Active Problem List   Diagnosis    Benign essential hypertension    Hyperlipidemia    Type 2 diabetes mellitus without complication (HCC)    HTN (hypertension)     Current Outpatient Medications   Medication Sig Dispense Refill    hydroCHLOROthiazide 25 MG Oral Tab Take 1 tablet (25 mg total) by mouth daily. 90 tablet 3    metFORMIN 500 MG Oral Tab Take 1 tablet (500 mg total) by mouth 2 (two) times daily with meals. 180 tablet 3    atorvastatin 20 MG Oral Tab TAKE 1 TABLET BY MOUTH EVERY DAY 90 tablet 3    losartan 100 MG Oral Tab Take 1 tablet (100 mg total) by mouth daily. 90 tablet 1    benzonatate 200 MG Oral Cap Take 1 capsule (200 mg total) by mouth 3 (three) times daily as needed for cough. 30 capsule 3           Latest Ref Rng & Units 2/15/2024    10:06 AM 9/20/2023    11:15 AM 2/20/2023    10:39 AM 9/6/2022    11:33 AM 3/29/2022     9:59 AM 9/29/2021    10:27 AM 8/11/2021     9:59 AM   Glucose and HbA1c   Glucose 70 - 99 mg/dL 120  115  127  103  113  125  117          Latest Ref Rng & Units 2/15/2024    10:06 AM 9/20/2023    11:15 AM 2/20/2023    10:39 AM 9/6/2022    11:33 AM 3/29/2022     9:59 AM 8/11/2021     9:59 AM 2/15/2021    10:17 AM   Cholesterol   Total Cholesterol <200 mg/dL 152  159  148  163  189  159  166    Triglycerides 30 - 149 mg/dL 88  125  131  169  109  168  106    HDL 40 - 59 mg/dL 41  41  43  40  46  41  45    LDL <100 mg/dL 94  96  82  94  123  89  100          Latest Ref Rng & Units 2/15/2024    10:06 AM 9/20/2023    11:15 AM 2/20/2023    10:39 AM 9/6/2022    11:33 AM 3/29/2022     9:59 AM 9/29/2021    10:27 AM 8/11/2021     9:59 AM   BUN and Cr   BUN 9 - 23 mg/dL 21  21  21  24  25  17  29    Creatinine 0.70 - 1.30 mg/dL 0.96  0.94  1.13  1.03  1.12  0.93  1.85          Latest Ref Rng & Units 2/15/2024    10:06 AM 9/20/2023    11:15 AM 2/20/2023    10:39 AM 9/6/2022    11:33 AM 3/29/2022      9:59 AM 8/11/2021     9:59 AM 2/15/2021    10:17 AM   AST and ALT   AST (SGOT) <=34 U/L 20  20  22  20  23  19  17    ALT (SGPT) 10 - 49 U/L 7  25  20  17  23  19  18          General Health      In the past six months, have you lost more than 10 pounds without trying?: 1 - Yes  Has your appetite been poor?: No  Type of Diet: Balanced;Other  How does the patient maintain a good energy level?: Daily Walks  How would you describe your daily physical activity?: Light  How would you describe your current health state?: Fair  Have you had any immunizations at another office such as Influenza, Hepatitis B, Tetanus, or Pneumococcal?: Yes     Functional Ability     Bathing or Showering: Able without help  Toileting: Able without help  Dressing: Able without help  Eating: Able without help  Driving: Able without help  Preparing your meals: Able without help  Managing money/bills: Able without help  Taking medications as prescribed: Able without help  Are you able to afford your medications?: Yes  Hearing Problems?: No     Functional Status     Hearing Problems?: No  Vision Problems? : No  Difficulty walking?: No  Difficulty dressing or bathing?: No  Problems with daily activities? : No  Memory Problems?: No      Fall/Risk Assessment                 Depression Screening (PHQ-2/PHQ-9): Over the LAST 2 WEEKS            Advance Directives     Do you have a healthcare power of ?: No  Do you have a living will?: No     Cognitive Assessment     What day of the week is this?: Correct  What month is it?: Correct  What year is it?: Correct  Recall \"Ball\": Correct  Recall \"Flag\": Correct  Recall \"Tree\": Correct         PREVENTATIVE SERVICES   INDICATIONS AND SCHEDULE Internal Lab or Procedure   Diabetes Screening     HbgA1C   Annually HgbA1C (%)   Date Value   02/15/2024 6.8 (H)       Fasting Blood Sugar (FSB)Annually Glucose (mg/dL)   Date Value   02/15/2024 120 (H)      Cardiovascular Disease Screening    LDL Annually LDL  Cholesterol (mg/dL)   Date Value   02/15/2024 94       EKG - w/ Initial Preventative Physical Exam only, or if medically necessary N/a   Colorectal Cancer Screening     Colonoscopy Screen every 10 years Health Maintenance   Topic Date Due    Colorectal Cancer Screening  03/30/2023       Flex Sigmoidoscopy Screen every 5 years No results found for this or any previous visit.    Fecal Occult Blood Annually Occult Blood (no units)   Date Value   03/29/2023 Negative      Glaucoma Screening     Ophthalmology Visit Annually Last diabetic eye exam was in 5/2023 with Dr. Jarvis   Immunizations     Zoster (Not covered by Medicare Part B) No orders found for this or any previous visit.     SPECIFIC DISEASE MONITORING Internal Lab or Procedure     Annual Monitoring of Persistent Medications  (ACE/ARB, Digoxin, Diuretics)        Potassium  Annually Potassium (mmol/L)   Date Value   02/15/2024 3.8       Creatinine  Annually Creatinine (mg/dL)   Date Value   02/15/2024 0.96       Digoxin Serum Conc  Annually No results found for: \"DIGOXIN\"     Diabetes     HgbA1C  Annually HgbA1C (%)   Date Value   02/15/2024 6.8 (H)       Creat/alb ratio  Annually Creatinine (mg/dL)   Date Value   02/15/2024 0.96       LDL  Annually LDL Cholesterol (mg/dL)   Date Value   02/15/2024 94       Dilated Eye exam  Annually     10/6/2022     1:35 PM   Data entered on:   Last Dilated Eye Exam 5/16/2022              ALLERGIES:   No Known Allergies  MEDICAL INFORMATION:   Past Medical History:   Diagnosis Date    Allergic rhinitis since covid    Arthritis     Diabetes (HCC)     Essential hypertension     Hyperlipidemia       Past Surgical History:   Procedure Laterality Date    COLONOSCOPY  2017    KNEE SURGERY      XR CHEST PA + LAT CHEST (CPT=71046)  07/05/2021    [facility]      Family History   Problem Relation Age of Onset    No Known Problems Father     No Known Problems Mother     No Known Problems Son     No Known Problems  Maternal Grandmother     No Known Problems Maternal Grandfather     No Known Problems Paternal Grandmother     No Known Problems Paternal Grandfather     No Known Problems Sister     No Known Problems Brother     No Known Problems Brother      Immunization History      Immunization History  Administered            Date(s) Administered    Covid-19 Vaccine Moderna 100 mcg/0.5 ml                          03/17/2021 04/16/2021      FLU VAC High Dose 65 YRS & Older PRSV Free (84025)                          09/08/2019 09/17/2020      FLUAD High Dose 65 yr and older (78042)                          10/12/2022      HIGH DOSE FLU 65 YRS AND OLDER PRSV FREE SINGLE D (35209) FLU CLINIC                          10/12/2021      Influenza             08/23/2023      Moderna Covid-19 Vaccine 50mcg/0.5ml 12yrs+ (3633-4901)                          11/29/2023      Pneumococcal (Prevnar 13)                          06/20/2019      Pneumococcal Conjugate PCV20                          08/23/2023      Pneumovax 23          12/14/2017      Zoster Vaccine Recombinant Adjuvanted (Shingrix)                          09/07/2023 11/08/2023        SOCIAL HISTORY:   Social History     Socioeconomic History    Marital status:    Tobacco Use    Smoking status: Never    Smokeless tobacco: Never   Vaping Use    Vaping Use: Never used   Substance and Sexual Activity    Alcohol use: No    Drug use: No   Other Topics Concern    Caffeine Concern No    Exercise Yes    Special Diet No    Stress Concern No    Weight Concern No        REVIEW OF SYSTEMS:   GENERAL: feels well otherwise  SKIN: denies any unusual skin lesions  EYES: denies blurred vision or double vision  HEENT: denies nasal congestion, sinus pain or ST  LUNGS: denies shortness of breath with exertion  CARDIOVASCULAR: denies chest pain on exertion  GI: denies abdominal pain, denies heartburn  : denies nocturia or changes in stream  MUSCULOSKELETAL: denies back pain  NEURO: denies  headaches  PSYCHE: denies depression or anxiety  HEMATOLOGIC: denies hx of anemia  ENDOCRINE: denies thyroid history  ALL/ASTHMA: denies hx of allergy or asthma    EXAM:   /72 (BP Location: Left arm, Patient Position: Sitting, Cuff Size: adult)   Pulse 62   Temp 97.9 °F (36.6 °C) (Temporal)   Resp 18   Wt 149 lb (67.6 kg)   SpO2 98%   BMI 25.59 kg/m²    >   BP Readings from Last 3 Encounters:   03/21/24 134/72   09/27/23 142/70   03/17/23 132/58     GENERAL: well developed, well nourished, in no apparent distress   SKIN: no rashes, no suspicious lesions  HEENT: atraumatic, normocephalic, ears and throat are clear                Hearing Assessed via: Questionnaire  EYES: PERRLA, EOMI, conjunctiva are clear    CHEST: no chest tenderness  LUNGS: clear to auscultation  CARDIO: RRR without murmur  GI: good BS's, no masses, HSM or tenderness  : two descended testicles, no masses, no hernia and no penile lesions  RECTAL: deferred  MUSCULOSKELETAL: back is not tender, FROM of the back  EXTREMITIES: no cyanosis, clubbing or edema  NEURO: Oriented times three, cranial nerves are intact, motor and sensory are grossly intact  FOOT: normal exam  Bilateral barefoot skin diabetic exam is normal, visualized feet and the appearance is normal.  Bilateral monofilament/sensation of both feet is normal.  Pulsation pedal pulse exam of both lower legs/feet is normal as well.       ASSESSMENT AND OTHER RELEVANT CHRONIC CONDITIONS:   Anand Rincon is a 73 year old male who presents for a Medicare Assessment.     PLAN SUMMARY:   Diagnoses and all orders for this visit:    Annual physical exam  Routine labs ordered today, await results. Counseled pt on healthy lifestyle changes. Vaccines today: UTD  .     Last PSA: 09/2023, normal  Last colonoscopy: overdue, amenable to Cologuard       Essential hypertension, benign  - BP well controlled  - 3/2024 lytes and Cr are normal  - cont Losartan and HCTZ   - DASH diet,  regular exercise, wt loss d/w pt  - RTC in 6 months (complete labs prior to visit)    -     hydroCHLOROthiazide 25 MG Oral Tab; Take 1 tablet (25 mg total) by mouth daily.  -     Comp Metabolic Panel (14) [E]; Future  -     Lipid Panel [E]; Future    Hyperlipidemia, unspecified hyperlipidemia type  - 3/2024 lipids with borderline LDL   - cont Atorvastatin 20 mg daily  - d/w pt a healthy, low-fat/low-cholesterol diet, regular exercise, and wt loss  - RTC in 6 months for f-u     -     atorvastatin 20 MG Oral Tab; TAKE 1 TABLET BY MOUTH EVERY DAY  -     Comp Metabolic Panel (14) [E]; Future  -     Lipid Panel [E]; Future    Type 2 diabetes mellitus without complication, unspecified whether long term insulin use (HCC)  - diabetes is well-controlled  - last A1c in 3/2024 : 6.8  - last microalbumin: 2/2024, normal  - last diabetic foot exam:   completed today 3/21/24  - last diabetic eye exam: 5/2023 (Dr. Jarvis in [place])  - cont Metformin to 500mg  BIDWM  - discussed appropriate diabetic   diet, regular exercise, and wt loss  - RTC in 6 months    -     metFORMIN 500 MG Oral Tab; Take 1 tablet (500 mg total) by mouth 2 (two) times daily with meals.  -     Hemoglobin A1C [E]; Future  -     Comp Metabolic Panel (14) [E]; Future  -     Lipid Panel [E]; Future    Screen for colon cancer  -     COLOGUARD COLON CANCER SCREENING (EXTERNAL)       The patient indicates understanding of these issues and agrees to the plan.  The patient is asked to return in 6 months for medication check and office visit  Diet counseling perfomed  Exercise counseling perfomed    SUGGESTED VACCINATIONS - Influenza, Pneumococcal, Zoster, Tetanus   Influenza: No recommendations at this time  Pneumonia: No recommendations at this time

## 2024-04-04 LAB — AMB EXT COLOGUARD RESULT: NEGATIVE

## 2024-04-16 ENCOUNTER — TELEPHONE (OUTPATIENT)
Dept: FAMILY MEDICINE CLINIC | Facility: CLINIC | Age: 74
End: 2024-04-16

## 2024-04-16 NOTE — TELEPHONE ENCOUNTER
Christiana from [employer] called, reports they received pt's Cologuard kit, but there is an issue with the date of collection written on the sample. The date of collection is the same as the day it was received at the lab, 4/5/2024.     Christiana reports they have tried to reach out to pt without success. She requests we reach out to pt and inquire about the dates, then give them a call back.     Christiana provided case # [id number] and their phone number, [phone].     Called pt, reports he did collect the sample on 4/4/2024. He took the sample and [employer] picked it up the same day.    Called [facility], informed them that pt collected sample on 4/4/2024. They v/u and will proceed with test.

## 2024-04-24 ENCOUNTER — TELEPHONE (OUTPATIENT)
Dept: FAMILY MEDICINE CLINIC | Facility: CLINIC | Age: 74
End: 2024-04-24

## 2024-04-24 NOTE — TELEPHONE ENCOUNTER
Test Result  Negative Negative   NEGATIVE TEST RESULT. A negative Cologuard result indicates a low likelihood that a colorectal cancer (CRC) or advanced adenoma (adenomatous polyps with more advanced pre-malignant features)  is present. The chance that a person with a negative Cologuard test has a colorectal cancer is less than 1 in 1500 (negative predictive value >99.9%) or has an  advanced adenoma is less than  5.3% (negative predictive value 94.7%). These data are based on a prospective cross-sectional study of 10,000 individuals at average risk for colorectal cancer who were screened with both Cologuard and colonoscopy. (Jah Gray al, N Engl J Med 2014;370(14):2059-9935) The normal value (reference range) for this assay is negative.    4/04/24

## 2024-04-25 NOTE — TELEPHONE ENCOUNTER
Please contact pt and inform of NEGATIVE Cologuard result.      Repeat testing in 3 years.    Thanks.

## 2024-06-10 DIAGNOSIS — I10 ESSENTIAL HYPERTENSION, BENIGN: ICD-10-CM

## 2024-06-12 RX ORDER — LOSARTAN POTASSIUM 100 MG/1
100 TABLET ORAL DAILY
Qty: 90 TABLET | Refills: 0 | Status: SHIPPED | OUTPATIENT
Start: 2024-06-12

## 2024-06-12 NOTE — TELEPHONE ENCOUNTER
Refill Passed Protocol:     Pt requesting refill of   Requested Prescriptions     Pending Prescriptions Disp Refills    LOSARTAN 100 MG Oral Tab [Pharmacy Med Name: LOSARTAN POTASSIUM 100 MG TAB] 90 tablet 1     Sig: TAKE 1 TABLET BY MOUTH EVERY DAY     Refill was approved and sent to pharmacy:     Hypertension Medications Protocol [id number]/10/2024 10:16 PM   Protocol Details CMP or BMP in past 12 months    Last BP reading less than 140/90    In person appointment or virtual visit in the past 12 mos or appointment in next 3 mos    EGFRCR or GFRNAA > 50        Last Time Medication was Filled:  01/02/2024    Last Office Visit with Provider: 3/21/2024    Appt. scheduled on 9/18/2024

## 2024-09-08 DIAGNOSIS — I10 ESSENTIAL HYPERTENSION, BENIGN: ICD-10-CM

## 2024-09-09 RX ORDER — LOSARTAN POTASSIUM 100 MG/1
100 TABLET ORAL DAILY
Qty: 90 TABLET | Refills: 0 | Status: SHIPPED | OUTPATIENT
Start: 2024-09-09

## 2024-09-09 NOTE — TELEPHONE ENCOUNTER
Refill Passed Protocol:     Pt requesting refill of   Requested Prescriptions     Pending Prescriptions Disp Refills    LOSARTAN 100 MG Oral Tab [Pharmacy Med Name: LOSARTAN POTASSIUM 100 MG TAB] 90 tablet 0     Sig: TAKE 1 TABLET BY MOUTH EVERY DAY      Refill was approved and sent to pharmacy:   Hypertension Medications Protocol [id number]/08/2024 08:07 AM   Protocol Details CMP or BMP in past 12 months    Last BP reading less than 140/90    In person appointment or virtual visit in the past 12 mos or appointment in next 3 mos    EGFRCR or GFRNAA > 50     Last Time Medication was Filled:  6/12/2024    Last Office Visit with Provider: 3/21/2024    Appt. scheduled on 9/23/2024

## 2024-09-12 ENCOUNTER — LAB ENCOUNTER (OUTPATIENT)
Dept: LAB | Facility: HOSPITAL | Age: 74
End: 2024-09-12
Attending: FAMILY MEDICINE
Payer: MEDICARE

## 2024-09-12 DIAGNOSIS — E11.9 TYPE 2 DIABETES MELLITUS WITHOUT COMPLICATION, UNSPECIFIED WHETHER LONG TERM INSULIN USE (HCC): ICD-10-CM

## 2024-09-12 DIAGNOSIS — E78.5 HYPERLIPIDEMIA, UNSPECIFIED HYPERLIPIDEMIA TYPE: ICD-10-CM

## 2024-09-12 DIAGNOSIS — I10 ESSENTIAL HYPERTENSION, BENIGN: ICD-10-CM

## 2024-09-12 LAB
ALBUMIN SERPL-MCNC: 4 G/DL (ref 3.2–4.8)
ALBUMIN/GLOB SERPL: 1.3 {RATIO} (ref 1–2)
ALP LIVER SERPL-CCNC: 68 U/L
ALT SERPL-CCNC: 13 U/L
ANION GAP SERPL CALC-SCNC: 8 MMOL/L (ref 0–18)
AST SERPL-CCNC: 20 U/L (ref ?–34)
BILIRUB SERPL-MCNC: 0.8 MG/DL (ref 0.2–1.1)
BUN BLD-MCNC: 20 MG/DL (ref 9–23)
BUN/CREAT SERPL: 20.8 (ref 10–20)
CALCIUM BLD-MCNC: 9.4 MG/DL (ref 8.7–10.4)
CHLORIDE SERPL-SCNC: 103 MMOL/L (ref 98–112)
CHOLEST SERPL-MCNC: 148 MG/DL (ref ?–200)
CO2 SERPL-SCNC: 27 MMOL/L (ref 21–32)
CREAT BLD-MCNC: 0.96 MG/DL
EGFRCR SERPLBLD CKD-EPI 2021: 82 ML/MIN/1.73M2 (ref 60–?)
EST. AVERAGE GLUCOSE BLD GHB EST-MCNC: 143 MG/DL (ref 68–126)
FASTING PATIENT LIPID ANSWER: YES
FASTING STATUS PATIENT QL REPORTED: YES
GLOBULIN PLAS-MCNC: 3.2 G/DL (ref 2–3.5)
GLUCOSE BLD-MCNC: 166 MG/DL (ref 70–99)
HBA1C MFR BLD: 6.6 % (ref ?–5.7)
HDLC SERPL-MCNC: 40 MG/DL (ref 40–59)
LDLC SERPL CALC-MCNC: 86 MG/DL (ref ?–100)
NONHDLC SERPL-MCNC: 108 MG/DL (ref ?–130)
OSMOLALITY SERPL CALC.SUM OF ELEC: 292 MOSM/KG (ref 275–295)
POTASSIUM SERPL-SCNC: 4.3 MMOL/L (ref 3.5–5.1)
PROT SERPL-MCNC: 7.2 G/DL (ref 5.7–8.2)
SODIUM SERPL-SCNC: 138 MMOL/L (ref 136–145)
TRIGL SERPL-MCNC: 125 MG/DL (ref 30–149)
VLDLC SERPL CALC-MCNC: 20 MG/DL (ref 0–30)

## 2024-09-12 PROCEDURE — 80061 LIPID PANEL: CPT

## 2024-09-12 PROCEDURE — 36415 COLL VENOUS BLD VENIPUNCTURE: CPT

## 2024-09-12 PROCEDURE — 83036 HEMOGLOBIN GLYCOSYLATED A1C: CPT

## 2024-09-12 PROCEDURE — 80053 COMPREHEN METABOLIC PANEL: CPT

## 2024-09-23 ENCOUNTER — OFFICE VISIT (OUTPATIENT)
Dept: FAMILY MEDICINE CLINIC | Facility: CLINIC | Age: 74
End: 2024-09-23
Payer: MEDICARE

## 2024-09-23 VITALS
WEIGHT: 148.63 LBS | RESPIRATION RATE: 18 BRPM | OXYGEN SATURATION: 97 % | BODY MASS INDEX: 26 KG/M2 | TEMPERATURE: 98 F | SYSTOLIC BLOOD PRESSURE: 130 MMHG | HEART RATE: 64 BPM | DIASTOLIC BLOOD PRESSURE: 60 MMHG

## 2024-09-23 DIAGNOSIS — E78.5 HYPERLIPIDEMIA, UNSPECIFIED HYPERLIPIDEMIA TYPE: ICD-10-CM

## 2024-09-23 DIAGNOSIS — E11.9 TYPE 2 DIABETES MELLITUS WITHOUT COMPLICATION, UNSPECIFIED WHETHER LONG TERM INSULIN USE (HCC): Primary | ICD-10-CM

## 2024-09-23 DIAGNOSIS — I10 ESSENTIAL HYPERTENSION, BENIGN: ICD-10-CM

## 2024-09-24 NOTE — PROGRESS NOTES
CHIEF COMPLAINT:   Chief Complaint   Patient presents with    Follow - Up     Diabetes, Cholesterol, HTN, medication follow-up.         HPI:     Anand Rincon is a 74 year old male presents for DM2, HTN, HL f-u.      Diabetes f-u: Pt A1c is 6.6 in 9/2024 . He takes Metformin daily as Rx'd.  He has no polyuria and no polydipsia. He saw Dr. Jarvis in 5/2024 for his diabetic eye exam.      Previous HPI from 3/2023: 2/2023 A1c was 6.9.  He feels well- has has no polyuria and no polydipsia. He is taking Metformin BIDWM, tolerating well without any issues.He completed his diabetic eye exam in 5/2022with Monica Jarvis.     Previous HPI: Most recent A1c in 9/2022 is 6.9. He is taking Metformin BIDWM, tolerating well without any issues. He has no polyuria and no polydipsia. He had his last diabetic eye exam with Dr. Jarvis on 2022.       Previous HPI from 4/2022: Recent A1c is 7.1. He thinks the increase is due to decreased physical activity and exercise in the winter.  He is taking the Metformin BIDWM, tolerating well.  He has no polyuria and no polydipsia. He will be coming back to [place] for his next eye exam with Dr. Jarvis.      Previous HPI from 10/2021: His last A1c in 9/2021 was 6.9. He feels well, no polyuria and no polydipsia.  He is taking Metformin BIDWM.  He had his diabetic eye exam with Dr. Jarvis in 7/2021.       HTN f-u:  He is taking Losartan and hydrochlorothiazide, no issues.  He does not have HA, vision changes, no CP, palpitations, and no leg swelling.     Previous HPI from 10/2021:He continues to take hydrochlorothiazide and Losartan, tolerating well. He has no HA, no CP, palpitations, and no leg swelling.      Hyperlipidemia f-u: He is taking Atorvastatin, tolerating well.                                      HISTORY:  Past Medical History:    Allergic rhinitis    Arthritis    Diabetes (HCC)    Essential hypertension    Hyperlipidemia      Past Surgical History:   Procedure Laterality Date     Colonoscopy  2017    Knee surgery      Xr chest pa + lat chest (cpt=71046)  07/05/2021    [facility]      Family History   Problem Relation Age of Onset    No Known Problems Father     No Known Problems Mother     No Known Problems Son     No Known Problems Maternal Grandmother     No Known Problems Maternal Grandfather     No Known Problems Paternal Grandmother     No Known Problems Paternal Grandfather     No Known Problems Sister     No Known Problems Brother     No Known Problems Brother       Social History:   Social History     Socioeconomic History    Marital status:    Tobacco Use    Smoking status: Never    Smokeless tobacco: Never   Vaping Use    Vaping status: Never Used   Substance and Sexual Activity    Alcohol use: No    Drug use: No   Other Topics Concern    Caffeine Concern No    Exercise Yes    Special Diet No    Stress Concern No    Weight Concern No        Medications (Active prior to today's visit):  Current Outpatient Medications   Medication Sig Dispense Refill    LOSARTAN 100 MG Oral Tab TAKE 1 TABLET BY MOUTH EVERY DAY 90 tablet 0    hydroCHLOROthiazide 25 MG Oral Tab Take 1 tablet (25 mg total) by mouth daily. 90 tablet 3    metFORMIN 500 MG Oral Tab Take 1 tablet (500 mg total) by mouth 2 (two) times daily with meals. 180 tablet 3    atorvastatin 20 MG Oral Tab TAKE 1 TABLET BY MOUTH EVERY DAY 90 tablet 3    benzonatate 200 MG Oral Cap Take 1 capsule (200 mg total) by mouth 3 (three) times daily as needed for cough. 30 capsule 3       Allergies:  No Known Allergies    PSFH elements reviewed from today and agreed except as otherwise stated in HPI.  ROS:     Review of Systems   Constitutional:  Negative for appetite change and fatigue.   Respiratory:  Negative for shortness of breath.    Cardiovascular:  Negative for chest pain, palpitations and leg swelling.   Gastrointestinal:  Negative for abdominal pain, constipation, diarrhea and nausea.   Neurological:   Negative for dizziness, weakness, numbness and headaches.         Pertinent positives and negatives noted in the the HPI.    PHYSICAL EXAM:   /60 (BP Location: Left arm, Patient Position: Sitting, Cuff Size: adult)   Pulse 64   Temp 97.6 °F (36.4 °C) (Temporal)   Resp 18   Wt 148 lb 9.6 oz (67.4 kg)   SpO2 97%   BMI 25.53 kg/m²   Vital signs reviewed.Appears stated age, well groomed.  Physical Exam  Vitals reviewed.   Constitutional:       General: He is not in acute distress.     Appearance: Normal appearance.   HENT:      Head: Normocephalic.   Cardiovascular:      Rate and Rhythm: Normal rate and regular rhythm.      Pulses: Normal pulses.      Heart sounds: Normal heart sounds.   Pulmonary:      Effort: Pulmonary effort is normal. No respiratory distress.      Breath sounds: Normal breath sounds.   Abdominal:      General: Bowel sounds are normal. There is no distension.      Palpations: Abdomen is soft.      Tenderness: There is no abdominal tenderness. There is no guarding.      Comments: No HSM   Musculoskeletal:      Right lower leg: No edema.      Left lower leg: No edema.   Skin:     General: Skin is warm and dry.   Neurological:      General: No focal deficit present.      Mental Status: He is alert and oriented to person, place, and time.   Psychiatric:         Behavior: Behavior normal.          LABS     Office Visit on 09/23/2024   Component Date Value    Cologuard 04/04/2024 Negative    [facility] Lab Encounter on 09/12/2024   Component Date Value    HgbA1C 09/12/2024 6.6 (H)     Estimated Average Glucose 09/12/2024 143 (H)     Glucose 09/12/2024 166 (H)     Sodium 09/12/2024 138     Potassium 09/12/2024 4.3     Chloride 09/12/2024 103     CO2 09/12/2024 27.0     Anion Gap 09/12/2024 8     BUN 09/12/2024 20     Creatinine 09/12/2024 0.96     BUN/CREA Ratio 09/12/2024 20.8 (H)     Calcium, Total 09/12/2024 9.4     Calculated Osmolality 09/12/2024 292     eGFR-Cr 09/12/2024 82     ALT 09/12/2024 13      AST 09/12/2024 20     Alkaline Phosphatase 09/12/2024 68     Bilirubin, Total 09/12/2024 0.8     Total Protein 09/12/2024 7.2     Albumin 09/12/2024 4.0     Globulin  09/12/2024 3.2     A/G Ratio 09/12/2024 1.3     Patient Fasting for CMP? 09/12/2024 Yes     Cholesterol, Total 09/12/2024 148     HDL Cholesterol 09/12/2024 40     Triglycerides 09/12/2024 125     LDL Cholesterol 09/12/2024 86     VLDL 09/12/2024 20     Non HDL Chol 09/12/2024 108     Patient Fasting for Lipi* 09/12/2024 Yes       REVIEWED THIS VISIT  ASSESSMENT/PLAN:   74 year old male with    1. Type 2 diabetes mellitus without complication, unspecified whether long term insulin use (HCC)  - diabetes is well-controlled  - last A1c in 9/2024 : 6.6  - last microalbumin: 2/2024, normal  - last diabetic foot exam:   completed 3/21/24  - last diabetic eye exam: 5/2024 (Dr. Jarvis in [place])  - cont Metformin to 500mg  BIDWM  - discussed appropriate diabetic   diet, regular exercise, and wt loss  - RTC in 6 months     - Hemoglobin A1C [E]; Future  - Comp Metabolic Panel (14) [E]; Future  - Lipid Panel [E]; Future  - Microalb/Creat Ratio, Random Urine [E]; Future    2. Essential hypertension, benign  - BP well controlled  - 9/2024 lytes and Cr are normal  - cont Losartan and HCTZ   - DASH diet, regular exercise, wt loss d/w pt  - RTC in 6 months (complete labs prior to visit)     - Comp Metabolic Panel (14) [E]; Future  - Lipid Panel [E]; Future    3. Hyperlipidemia, unspecified hyperlipidemia type  - 9/2024 lipids with borderline LDL   - cont Atorvastatin 20 mg daily  - d/w pt a healthy, low-fat/low-cholesterol diet, regular exercise, and wt loss  - RTC in 6 months for f-u    - Comp Metabolic Panel (14) [E]; Future  - Lipid Panel [E]; Future     The patient and provider have a longitudinal relationship to address/treat the serious or   complex condition(s) as stated in this encounter.     Meds This Visit:  Requested Prescriptions      No  prescriptions requested or ordered in this encounter       Health Maintenance:  Health Maintenance   Topic Date Due    Colorectal Cancer Screening  04/05/2024    COVID-19 Vaccine (4 - 2023-24 season) 09/01/2024    Influenza Vaccine (1) 10/01/2024    Diabetes Care: Microalb/Creat Ratio  02/15/2025    Diabetes Care A1C  03/12/2025    Diabetes Care Foot Exam  03/21/2025    Annual Physical  03/21/2025    Diabetes Care Dilated Eye Exam  05/09/2025    Diabetes Care: GFR  09/12/2025    PSA  09/20/2025    Annual Depression Screening  Completed    Fall Risk Screening (Annual)  Completed    Pneumococcal Vaccine: 65+ Years  Completed    Zoster Vaccines  Completed         Patient/Caregiver Education: There are no barriers to learning. Medical education done.   Outcome: Patient verbalizes understanding and agrees with plan. Advised to call or RTC if symptoms persist or worsen.    Problem List:     Patient Active Problem List   Diagnosis    Benign essential hypertension    Hyperlipidemia    Type 2 diabetes mellitus without complication (HCC)    HTN (hypertension)       Imaging & Referrals:  None     9/23/2024  Ember Chow MD      Patient understands plan and follow-up.  Return in about 6 months (around 3/23/2025) for annual physical, Diabetes, HTN, Cholesterol, medication follow-up.

## 2024-12-05 DIAGNOSIS — I10 ESSENTIAL HYPERTENSION, BENIGN: ICD-10-CM

## 2024-12-05 RX ORDER — LOSARTAN POTASSIUM 100 MG/1
100 TABLET ORAL DAILY
Qty: 90 TABLET | Refills: 0 | Status: SHIPPED | OUTPATIENT
Start: 2024-12-05

## 2024-12-05 NOTE — TELEPHONE ENCOUNTER
Refill Passed Protocol:     Pt requesting refill of   Requested Prescriptions     Pending Prescriptions Disp Refills    LOSARTAN 100 MG Oral Tab [Pharmacy Med Name: LOSARTAN POTASSIUM 100 MG TAB] 90 tablet 0     Sig: TAKE 1 TABLET BY MOUTH EVERY DAY     Last Time Medication was Filled:  9/09/2024 90 days 0 refills    Last Office Visit with Provider: 9/23/2024  Essential hypertension, benign  - BP well controlled  - 9/2024 lytes and Cr are normal  - cont Losartan and HCTZ   - DASH diet, regular exercise, wt loss d/w pt  - RTC in 6 months (complete labs prior to visit)    Return in about 6 months (around 3/23/2025) for annual physical, Diabetes, HTN, Cholesterol, medication follow-up.   No future appointments.

## 2025-02-05 DIAGNOSIS — E11.9 TYPE 2 DIABETES MELLITUS WITHOUT COMPLICATION, UNSPECIFIED WHETHER LONG TERM INSULIN USE (HCC): ICD-10-CM

## 2025-02-17 ENCOUNTER — LAB ENCOUNTER (OUTPATIENT)
Dept: LAB | Facility: HOSPITAL | Age: 75
End: 2025-02-17
Attending: FAMILY MEDICINE
Payer: MEDICARE

## 2025-02-17 DIAGNOSIS — E78.5 HYPERLIPIDEMIA, UNSPECIFIED HYPERLIPIDEMIA TYPE: ICD-10-CM

## 2025-02-17 DIAGNOSIS — E11.9 TYPE 2 DIABETES MELLITUS WITHOUT COMPLICATION, UNSPECIFIED WHETHER LONG TERM INSULIN USE (HCC): ICD-10-CM

## 2025-02-17 DIAGNOSIS — I10 ESSENTIAL HYPERTENSION, BENIGN: ICD-10-CM

## 2025-02-17 LAB
ALBUMIN SERPL-MCNC: 4.3 G/DL (ref 3.2–4.8)
ALBUMIN/GLOB SERPL: 1.4 {RATIO} (ref 1–2)
ALP LIVER SERPL-CCNC: 65 U/L
ALT SERPL-CCNC: 15 U/L
ANION GAP SERPL CALC-SCNC: 8 MMOL/L (ref 0–18)
AST SERPL-CCNC: 23 U/L (ref ?–34)
BILIRUB SERPL-MCNC: 0.7 MG/DL (ref 0.2–1.1)
BUN BLD-MCNC: 16 MG/DL (ref 9–23)
BUN/CREAT SERPL: 18.2 (ref 10–20)
CALCIUM BLD-MCNC: 9.6 MG/DL (ref 8.7–10.4)
CHLORIDE SERPL-SCNC: 101 MMOL/L (ref 98–112)
CHOLEST SERPL-MCNC: 165 MG/DL (ref ?–200)
CO2 SERPL-SCNC: 29 MMOL/L (ref 21–32)
CREAT BLD-MCNC: 0.88 MG/DL
CREAT UR-SCNC: 38.7 MG/DL
EGFRCR SERPLBLD CKD-EPI 2021: 90 ML/MIN/1.73M2 (ref 60–?)
EST. AVERAGE GLUCOSE BLD GHB EST-MCNC: 148 MG/DL (ref 68–126)
FASTING PATIENT LIPID ANSWER: YES
FASTING STATUS PATIENT QL REPORTED: YES
GLOBULIN PLAS-MCNC: 3 G/DL (ref 2–3.5)
GLUCOSE BLD-MCNC: 123 MG/DL (ref 70–99)
HBA1C MFR BLD: 6.8 % (ref ?–5.7)
HDLC SERPL-MCNC: 42 MG/DL (ref 40–59)
LDLC SERPL CALC-MCNC: 97 MG/DL (ref ?–100)
MICROALBUMIN UR-MCNC: <0.3 MG/DL
NONHDLC SERPL-MCNC: 123 MG/DL (ref ?–130)
OSMOLALITY SERPL CALC.SUM OF ELEC: 289 MOSM/KG (ref 275–295)
POTASSIUM SERPL-SCNC: 3.8 MMOL/L (ref 3.5–5.1)
PROT SERPL-MCNC: 7.3 G/DL (ref 5.7–8.2)
SODIUM SERPL-SCNC: 138 MMOL/L (ref 136–145)
TRIGL SERPL-MCNC: 149 MG/DL (ref 30–149)
VLDLC SERPL CALC-MCNC: 25 MG/DL (ref 0–30)

## 2025-02-17 PROCEDURE — 80053 COMPREHEN METABOLIC PANEL: CPT

## 2025-02-17 PROCEDURE — 83036 HEMOGLOBIN GLYCOSYLATED A1C: CPT

## 2025-02-17 PROCEDURE — 80061 LIPID PANEL: CPT

## 2025-02-17 PROCEDURE — 36415 COLL VENOUS BLD VENIPUNCTURE: CPT

## 2025-02-17 PROCEDURE — 82570 ASSAY OF URINE CREATININE: CPT

## 2025-02-17 PROCEDURE — 82043 UR ALBUMIN QUANTITATIVE: CPT

## 2025-02-27 DIAGNOSIS — I10 ESSENTIAL HYPERTENSION, BENIGN: ICD-10-CM

## 2025-02-27 RX ORDER — LOSARTAN POTASSIUM 100 MG/1
100 TABLET ORAL DAILY
Qty: 90 TABLET | Refills: 0 | Status: SHIPPED | OUTPATIENT
Start: 2025-02-27

## 2025-02-27 NOTE — TELEPHONE ENCOUNTER
Refill(s) Requested:   Requested Prescriptions     Pending Prescriptions Disp Refills    LOSARTAN 100 MG Oral Tab [Pharmacy Med Name: LOSARTAN POTASSIUM 100 MG TAB] 90 tablet 0     Sig: TAKE 1 TABLET BY MOUTH EVERY DAY     Medication Last Prescribed:  12/05/2024 90 days 0 refills     Has dose or medication been changed    since last prescription? *Review notes*    []  Yes       [x]  No     Last office visit: 9/23/2024 (in-office)  Visit date not found (virtual visit)     Relevant details from LOV note:    Essential hypertension, benign  - BP well controlled  - 9/2024 lytes and Cr are normal  - cont Losartan and HCTZ   - DASH diet, regular exercise, wt loss d/w pt  - RTC in 6 months (complete labs prior to visit)     Patient was asked to follow-up in: Return in about 6 months (around 3/23/2025) for annual physical, Diabetes, HTN, Cholesterol, medication follow-up.     Appointment due: March 2025    Future Appointments: 3/26/2025 Ember Chow MD     Action taken:  [x] Medication refilled per protocol

## 2025-03-26 ENCOUNTER — OFFICE VISIT (OUTPATIENT)
Dept: FAMILY MEDICINE CLINIC | Facility: CLINIC | Age: 75
End: 2025-03-26
Payer: MEDICARE

## 2025-03-26 VITALS
RESPIRATION RATE: 18 BRPM | BODY MASS INDEX: 25.13 KG/M2 | OXYGEN SATURATION: 96 % | SYSTOLIC BLOOD PRESSURE: 140 MMHG | TEMPERATURE: 98 F | HEART RATE: 70 BPM | HEIGHT: 64.8 IN | DIASTOLIC BLOOD PRESSURE: 70 MMHG | WEIGHT: 150.81 LBS

## 2025-03-26 DIAGNOSIS — E78.5 HYPERLIPIDEMIA, UNSPECIFIED HYPERLIPIDEMIA TYPE: ICD-10-CM

## 2025-03-26 DIAGNOSIS — Z00.00 ANNUAL PHYSICAL EXAM: Primary | ICD-10-CM

## 2025-03-26 DIAGNOSIS — Z12.5 PROSTATE CANCER SCREENING: ICD-10-CM

## 2025-03-26 DIAGNOSIS — Z23 NEED FOR VACCINATION: ICD-10-CM

## 2025-03-26 DIAGNOSIS — J18.9 COMMUNITY ACQUIRED PNEUMONIA OF LEFT LOWER LOBE OF LUNG: ICD-10-CM

## 2025-03-26 DIAGNOSIS — I10 ESSENTIAL HYPERTENSION, BENIGN: ICD-10-CM

## 2025-03-26 DIAGNOSIS — E11.9 TYPE 2 DIABETES MELLITUS WITHOUT COMPLICATION, UNSPECIFIED WHETHER LONG TERM INSULIN USE (HCC): ICD-10-CM

## 2025-03-26 RX ORDER — AZITHROMYCIN 250 MG/1
TABLET, FILM COATED ORAL
Qty: 6 TABLET | Refills: 0 | Status: SHIPPED | OUTPATIENT
Start: 2025-03-26 | End: 2025-03-31

## 2025-04-01 NOTE — PROGRESS NOTES
REASON FOR VISIT:    Anand Rincon is a 74 year old male who presents for a Medicare Annual Wellness visit.    Annual physical:  Overall, pt states he feels well.      Sexually active:monogamous with wife  Last PSA: 09/2023, normal; ordered for next set of labs  Last colonoscopy: overdue, prefers to not do screening     Exercise:walking most of the day, around yard and doing housework  Diet: regular diet tries to be healthy     Cough: He has been sick for 3 wks with nonproductive cough. No SOB, no wheezing.  Has no F/C.  Has been ok sleeping.  Has been taking cough drops.  No recent travel. His wife is also sick with similar sx.    Diabetes f-u: Pt A1c is 6.8 in 3/2025 . He takes Metformin daily as Rx'd.  He has no polyuria and no polydipsia. He saw Dr. Jarvis in 5/2024 for his diabetic eye exam.      Previous HPI from 3/2023: 2/2023 A1c was 6.9.  He feels well- has has no polyuria and no polydipsia. He is taking Metformin BIDWM, tolerating well without any issues.He completed his diabetic eye exam in 5/2022with Monica Jarvis.     Previous HPI: Most recent A1c in 9/2022 is 6.9. He is taking Metformin BIDWM, tolerating well without any issues. He has no polyuria and no polydipsia. He had his last diabetic eye exam with Dr. Jarvis on 2022.       Previous HPI from 4/2022: Recent A1c is 7.1. He thinks the increase is due to decreased physical activity and exercise in the winter.  He is taking the Metformin BIDWM, tolerating well.  He has no polyuria and no polydipsia. He will be coming back to [place] for his next eye exam with Dr. Jarvis.      Previous HPI from 10/2021: His last A1c in 9/2021 was 6.9. He feels well, no polyuria and no polydipsia.  He is taking Metformin BIDWM.  He had his diabetic eye exam with Dr. Jarvis in 7/2021.       HTN f-u:  He is taking Losartan and hydrochlorothiazide, no issues.  He does not have HA, vision changes, no CP, palpitations, and no leg swelling.     Previous HPI from  10/2021:He continues to take hydrochlorothiazide and Losartan, tolerating well. He has no HA, no CP, palpitations, and no leg swelling.      Hyperlipidemia f-u: He is taking Atorvastatin, tolerating well. 3/2025 lipids stable.    Patient Care Team: Patient Care Team:  Ember Chow MD as PCP - General (Family Medicine)    Patient Active Problem List   Diagnosis    Benign essential hypertension    Hyperlipidemia    Type 2 diabetes mellitus without complication (HCC)    HTN (hypertension)     Current Outpatient Medications   Medication Sig Dispense Refill    azithromycin 250 MG Oral Tab Take 2 tablets (500 mg total) by mouth daily for 1 day, THEN 1 tablet (250 mg total) daily for 4 days. 6 tablet 0    LOSARTAN 100 MG Oral Tab TAKE 1 TABLET BY MOUTH EVERY DAY 90 tablet 0    metFORMIN 500 MG Oral Tab TAKE 1 TABLET BY MOUTH TWICE A DAY WITH MEALS 180 tablet 0    hydroCHLOROthiazide 25 MG Oral Tab Take 1 tablet (25 mg total) by mouth daily. 90 tablet 3    atorvastatin 20 MG Oral Tab TAKE 1 TABLET BY MOUTH EVERY DAY 90 tablet 3    benzonatate 200 MG Oral Cap Take 1 capsule (200 mg total) by mouth 3 (three) times daily as needed for cough. 30 capsule 3           Latest Ref Rng & Units 2/17/2025    11:09 AM 9/12/2024    10:58 AM 2/15/2024    10:06 AM 9/20/2023    11:15 AM 2/20/2023    10:39 AM 9/6/2022    11:33 AM 3/29/2022     9:59 AM   Glucose and HbA1c   Glucose 70 - 99 mg/dL 123  166  120  115  127  103  113          Latest Ref Rng & Units 2/17/2025    11:09 AM 9/12/2024    10:58 AM 2/15/2024    10:06 AM 9/20/2023    11:15 AM 2/20/2023    10:39 AM 9/6/2022    11:33 AM 3/29/2022     9:59 AM   Cholesterol   Total Cholesterol <200 mg/dL 165  148  152  159  148  163  189    Triglycerides 30 - 149 mg/dL 149  125  88  125  131  169  109    HDL 40 - 59 mg/dL 42  40  41  41  43  40  46    LDL <100 mg/dL 97  86  94  96  82  94  123          Latest Ref Rng & Units 2/17/2025    11:09 AM 9/12/2024    10:58 AM 2/15/2024     10:06 AM 9/20/2023    11:15 AM 2/20/2023    10:39 AM 9/6/2022    11:33 AM 3/29/2022     9:59 AM   BUN and Cr   BUN 9 - 23 mg/dL 16  20  21  21  21  24  25    Creatinine 0.70 - 1.30 mg/dL 0.88  0.96  0.96  0.94  1.13  1.03  1.12          Latest Ref Rng & Units 2/17/2025    11:09 AM 9/12/2024    10:58 AM 2/15/2024    10:06 AM 9/20/2023    11:15 AM 2/20/2023    10:39 AM 9/6/2022    11:33 AM 3/29/2022     9:59 AM   AST and ALT   AST (SGOT) <34 U/L 23  20  20  20  22  20  23    ALT (SGPT) 10 - 49 U/L 15  13  7  25  20  17  23          General Health      In the past six months, have you lost more than 10 pounds without trying?: 2 - No  Has your appetite been poor?: No  Type of Diet: Balanced  How does the patient maintain a good energy level?: Appropriate Exercise, Daily Walks, Stretching  How would you describe your daily physical activity?: Light  How would you describe your current health state?: Fair  How do you maintain positive mental well-being?:  (self care)  Have you had any immunizations at another office such as Influenza, Hepatitis B, Tetanus, or Pneumococcal?: No     Functional Ability     Bathing or Showering: Able without help  Toileting: Able without help  Dressing: Able without help  Eating: Able without help  Driving: Able without help  Preparing your meals: Able without help  Managing money/bills: Able without help  Taking medications as prescribed: Able without help  Are you able to afford your medications?: Yes  Hearing Problems?: No     Functional Status     Hearing Problems?: No  Vision Problems? : No  Difficulty walking?: No  Difficulty dressing or bathing?: No  Problems with daily activities? : No  Memory Problems?: No      Fall/Risk Assessment                 Depression Screening (PHQ-2/PHQ-9): Over the LAST 2 WEEKS            Advance Directives     Do you have a healthcare power of ?: No  Do you have a living will?: No     Cognitive Assessment     What day of the week is this?:  Correct  What month is it?: Correct  What year is it?: Correct  Recall \"Ball\": Correct  Recall \"Flag\": Correct  Recall \"Tree\": Correct         PREVENTATIVE SERVICES   INDICATIONS AND SCHEDULE Internal Lab or Procedure   Diabetes Screening     HbgA1C   Annually HgbA1C (%)   Date Value   02/17/2025 6.8 (H)       Fasting Blood Sugar (FSB)Annually Glucose (mg/dL)   Date Value   02/17/2025 123 (H)      Cardiovascular Disease Screening    LDL Annually LDL Cholesterol (mg/dL)   Date Value   02/17/2025 97       EKG - w/ Initial Preventative Physical Exam only, or if medically necessary N/a   Colorectal Cancer Screening     Colonoscopy Screen every 10 years Health Maintenance   Topic Date Due    Colorectal Cancer Screening  04/05/2024       Flex Sigmoidoscopy Screen every 5 years No results found for this or any previous visit.    Fecal Occult Blood Annually Occult Blood (no units)   Date Value   03/29/2023 Negative      Glaucoma Screening     Ophthalmology Visit Annually Has upcoming appt with Dr. Jarvis for diabetic eye exam   Immunizations     Zoster (Not covered by Medicare Part B) No orders found for this or any previous visit.     SPECIFIC DISEASE MONITORING Internal Lab or Procedure     Annual Monitoring of Persistent Medications  (ACE/ARB, Digoxin, Diuretics)        Potassium  Annually Potassium (mmol/L)   Date Value   02/17/2025 3.8       Creatinine  Annually Creatinine (mg/dL)   Date Value   02/17/2025 0.88       Digoxin Serum Conc  Annually No results found for: \"DIGOXIN\"     Diabetes     HgbA1C  Annually HgbA1C (%)   Date Value   02/17/2025 6.8 (H)       Creat/alb ratio  Annually Creatinine (mg/dL)   Date Value   02/17/2025 0.88       LDL  Annually LDL Cholesterol (mg/dL)   Date Value   02/17/2025 97       Dilated Eye exam  Annually     9/11/2024    12:09 PM   Data entered on:   Last Dilated Eye Exam 5/9/2024              ALLERGIES:   Allergies[1]  MEDICAL INFORMATION:   Past Medical History:    Allergic rhinitis     Arthritis    Diabetes (HCC)    Essential hypertension    Hyperlipidemia      Past Surgical History:   Procedure Laterality Date    Colonoscopy  2017    Knee surgery      Xr chest pa + lat chest (cpt=71046)  07/05/2021    at [facility]      Family History   Problem Relation Age of Onset    No Known Problems Father     No Known Problems Mother     No Known Problems Son     No Known Problems Maternal Grandmother     No Known Problems Maternal Grandfather     No Known Problems Paternal Grandmother     No Known Problems Paternal Grandfather     No Known Problems Sister     No Known Problems Brother     No Known Problems Brother      Immunization History      Immunization History  Administered            Date(s) Administered    Covid-19 Vaccine Moderna 100 mcg/0.5 ml                          03/17/2021 04/16/2021      FLU VAC High Dose 65 YRS & Older PRSV Free (66491)                          09/08/2019 09/17/2020      FLUAD High Dose 65 yr and older (53407)                          10/12/2022      High Dose Fluzone Influenza Vaccine, 65yr+ PF 0.5mL (82428)                          10/12/2021      Influenza             08/23/2023      Moderna Covid-19 Vaccine 50mcg/0.5ml 12yrs+                          11/29/2023      Pneumococcal (Prevnar 13)                          06/20/2019      Pneumococcal Conjugate PCV20                          08/23/2023      Pneumovax 23          12/14/2017      Zoster Vaccine Recombinant Adjuvanted (Shingrix)                          09/07/2023 11/08/2023        SOCIAL HISTORY:   Social History     Socioeconomic History    Marital status:    Tobacco Use    Smoking status: Never    Smokeless tobacco: Never   Vaping Use    Vaping status: Never Used   Substance and Sexual Activity    Alcohol use: No    Drug use: No   Other Topics Concern    Caffeine Concern No    Exercise Yes    Special Diet No    Stress Concern No    Weight Concern No        REVIEW OF SYSTEMS:    GENERAL: feels well otherwise  SKIN: denies any unusual skin lesions  EYES: denies blurred vision or double vision  HEENT: denies nasal congestion, sinus pain or ST  LUNGS: denies shortness of breath with exertion  CARDIOVASCULAR: denies chest pain on exertion  GI: denies abdominal pain, denies heartburn  : denies nocturia or changes in stream  MUSCULOSKELETAL: denies back pain  NEURO: denies headaches  PSYCHE: denies depression or anxiety  HEMATOLOGIC: denies hx of anemia  ENDOCRINE: denies thyroid history  ALL/ASTHMA: denies hx of allergy or asthma    EXAM:   /70 (BP Location: Right arm, Patient Position: Sitting, Cuff Size: adult)   Pulse 70   Temp 98.4 °F (36.9 °C) (Temporal)   Resp 18   Ht 5' 4.8\" (1.646 m)   Wt 150 lb 12.8 oz (68.4 kg)   SpO2 96%   BMI 25.25 kg/m²    >   BP Readings from Last 3 Encounters:   03/26/25 140/70   09/23/24 130/60   03/21/24 134/72     GENERAL: well developed, well nourished, in no apparent distress   SKIN: no rashes, no suspicious lesions  HEENT: atraumatic, normocephalic, ears and throat are clear                Hearing Assessed via: Questionnaire  EYES: PERRLA, EOMI, conjunctiva are clear    CHEST: no chest tenderness  LUNGS: decreased BS of left lower lobe, no wheezing  CARDIO: RRR without murmur  GI: good BS's, no masses, HSM or tenderness  : two descended testicles, no masses, no hernia and no penile lesions  RECTAL: deferred  MUSCULOSKELETAL: back is not tender, FROM of the back  EXTREMITIES: no cyanosis, clubbing or edema  NEURO: Oriented times three, cranial nerves are intact, motor and sensory are grossly intact  FOOT: diabetic monofilament testing normal on both feet  Bilateral barefoot skin diabetic exam is normal, visualized feet and the appearance is normal.  Bilateral monofilament/sensation of both feet is normal.  Pulsation pedal pulse exam of both lower legs/feet is normal as well.     ASSESSMENT AND OTHER RELEVANT CHRONIC CONDITIONS:   [place]  Arturo Rincon is a 74 year old male who presents for a Medicare Assessment.     PLAN SUMMARY:   Diagnoses and all orders for this visit:    Annual physical exam  Routine labs ordered today, await results. Counseled pt on healthy lifestyle changes. Vaccines today: UTD .     Last PSA: 09/2023, normal; ordered for next set of labs  Last colonoscopy: overdue, prefers to not do screening     Community acquired pneumonia of left lower lobe of lung  - has been sick x 3 wks  - START Azithromycin, R/B/SE of new med d/w pt  - if sx worsen or become severe advised to go to ED    -     azithromycin 250 MG Oral Tab; Take 2 tablets (500 mg total) by mouth daily for 1 day, THEN 1 tablet (250 mg total) daily for 4 days.    Type 2 diabetes mellitus without complication, unspecified whether long term insulin use (HCC)  - diabetes is well-controlled  - last A1c in 3/2025 : 6.8  - last microalbumin: 2/2025, normal  - last diabetic foot exam:   completed tpday 3/26/25  - last diabetic eye exam: 5/2024 (Dr. Jarvis in [place]), due soon  - cont Metformin to 500mg  BIDWM  - discussed appropriate diabetic   diet, regular exercise, and wt loss  - RTC in 6 months     -     Hemoglobin A1C [E]; Future  -     Comp Metabolic Panel (14) [E]; Future  -     Lipid Panel [E]; Future    Hyperlipidemia, unspecified hyperlipidemia type  - 2/2025 lipids stable  - cont Atorvastatin 20 mg daily  - d/w pt a healthy, low-fat/low-cholesterol diet, regular exercise, and wt loss  - RTC in 6 months for f-u    -     Comp Metabolic Panel (14) [E]; Future  -     Lipid Panel [E]; Future    Essential hypertension, benign  - BP well controlled  - 2/2025 lytes and Cr are normal  - cont Losartan and HCTZ   - DASH diet, regular exercise, wt loss d/w pt  - RTC in 6 months (complete labs prior to visit)  -     Comp Metabolic Panel (14) [E]; Future  -     Lipid Panel [E]; Future    Need for vaccination  -     Influenza Refused    Prostate cancer screening  -      PSA, Total (Screening) [E]; Future       The patient indicates understanding of these issues and agrees to the plan.  The patient is asked to return in 6 months for medication check and office visit  Diet counseling perfomed  Exercise counseling perfomed    SUGGESTED VACCINATIONS - Influenza, Pneumococcal, Zoster, Tetanus   Influenza: No recommendations at this time  Pneumonia: No recommendations at this time            [1] No Known Allergies

## 2025-05-08 DIAGNOSIS — E11.9 TYPE 2 DIABETES MELLITUS WITHOUT COMPLICATION, UNSPECIFIED WHETHER LONG TERM INSULIN USE (HCC): ICD-10-CM

## 2025-05-08 NOTE — TELEPHONE ENCOUNTER
Requested Prescriptions     Pending Prescriptions Disp Refills    METFORMIN 500 MG Oral Tab [Pharmacy Med Name: METFORMIN  MG TABLET] 180 tablet 0     Sig: TAKE 1 TABLET BY MOUTH TWICE A DAY WITH FOOD     LOV 3/26/2025     Patient was asked to follow-up in: 6 months    Appointment scheduled: 9/22/2025 Ember Chow MD     Medication refilled per protocol.

## 2025-05-27 DIAGNOSIS — I10 ESSENTIAL HYPERTENSION, BENIGN: ICD-10-CM

## 2025-05-27 DIAGNOSIS — E78.5 HYPERLIPIDEMIA, UNSPECIFIED HYPERLIPIDEMIA TYPE: ICD-10-CM

## 2025-05-28 RX ORDER — HYDROCHLOROTHIAZIDE 25 MG/1
25 TABLET ORAL DAILY
Qty: 90 TABLET | Refills: 0 | Status: SHIPPED | OUTPATIENT
Start: 2025-05-28

## 2025-05-28 RX ORDER — ATORVASTATIN CALCIUM 20 MG/1
20 TABLET, FILM COATED ORAL DAILY
Qty: 90 TABLET | Refills: 0 | Status: SHIPPED | OUTPATIENT
Start: 2025-05-28

## 2025-05-28 NOTE — TELEPHONE ENCOUNTER
Refill Request Action taken:  [x] Medication refilled per protocol    Refill(s) Requested:   Requested Prescriptions     Pending Prescriptions Disp Refills    hydroCHLOROthiazide 25 MG Oral Tab [Pharmacy Med Name: HYDROCHLOROTHIAZIDE 25 MG TAB] 90 tablet 0     Sig: TAKE 1 TABLET (25 MG TOTAL) BY MOUTH DAILY.    atorvastatin 20 MG Oral Tab [Pharmacy Med Name: ATORVASTATIN 20 MG TABLET] 90 tablet 0     Sig: TAKE 1 TABLET BY MOUTH EVERY DAY     Medication Last Prescribed:    Atorvastatin 20 mg 3/21/2024 90 days 3 refill  Hydrochlorothiazide 25 mg 3/21/2024 90 days 3 refill     Has dose or medication been changed    since last prescription? *Review notes*    []  Yes       [x]  No     Last office visit: 3/26/2025 (in-office)  Visit date not found (virtual visit)     Relevant details from LOV note:   Hyperlipidemia, unspecified hyperlipidemia type  - 2/2025 lipids stable  - cont Atorvastatin 20 mg daily  - d/w pt a healthy, low-fat/low-cholesterol diet, regular exercise, and wt loss  - RTC in 6 months for f-u  Essential hypertension, benign  - BP well controlled  - 2/2025 lytes and Cr are normal  - cont Losartan and HCTZ   - DASH diet, regular exercise, wt loss d/w pt  - RTC in 6 months (complete labs prior to visit)  -     Comp Metabolic Panel (14) [E]; Future  -     Lipid Panel [E]; Future    Relevant lab results:   Lab Results   Component Value Date    CHOLEST 165 02/17/2025    TRIG 149 02/17/2025    HDL 42 02/17/2025    LDL 97 02/17/2025    VLDL 25 02/17/2025    NONHDLC 123 02/17/2025      Patient was asked to follow-up in: 6 months    Appointment due: September 2025    Future Appointments: 9/22/2025 Ember Chow MD

## 2025-06-08 DIAGNOSIS — I10 ESSENTIAL HYPERTENSION, BENIGN: ICD-10-CM

## 2025-06-10 RX ORDER — LOSARTAN POTASSIUM 100 MG/1
100 TABLET ORAL DAILY
Qty: 90 TABLET | Refills: 0 | OUTPATIENT
Start: 2025-06-10

## 2025-08-11 DIAGNOSIS — I10 ESSENTIAL HYPERTENSION, BENIGN: ICD-10-CM

## 2025-08-13 RX ORDER — LOSARTAN POTASSIUM 100 MG/1
100 TABLET ORAL DAILY
Qty: 100 TABLET | Refills: 3 | Status: SHIPPED | OUTPATIENT
Start: 2025-08-13

## 2025-08-14 DIAGNOSIS — I10 ESSENTIAL HYPERTENSION, BENIGN: ICD-10-CM

## 2025-08-14 DIAGNOSIS — E11.9 TYPE 2 DIABETES MELLITUS WITHOUT COMPLICATION, UNSPECIFIED WHETHER LONG TERM INSULIN USE (HCC): ICD-10-CM

## 2025-08-14 DIAGNOSIS — E78.5 HYPERLIPIDEMIA, UNSPECIFIED HYPERLIPIDEMIA TYPE: ICD-10-CM

## 2025-08-18 RX ORDER — ATORVASTATIN CALCIUM 20 MG/1
20 TABLET, FILM COATED ORAL DAILY
Qty: 90 TABLET | Refills: 3 | Status: SHIPPED | OUTPATIENT
Start: 2025-08-18

## 2025-08-18 RX ORDER — HYDROCHLOROTHIAZIDE 25 MG/1
25 TABLET ORAL DAILY
Qty: 100 TABLET | Refills: 3 | Status: SHIPPED | OUTPATIENT
Start: 2025-08-18

## (undated) DIAGNOSIS — I10 ESSENTIAL HYPERTENSION, BENIGN: ICD-10-CM

## (undated) DIAGNOSIS — Z23 NEED FOR VACCINATION: Primary | ICD-10-CM

## (undated) DIAGNOSIS — E11.9 TYPE 2 DIABETES MELLITUS WITHOUT COMPLICATION, UNSPECIFIED WHETHER LONG TERM INSULIN USE (HCC): ICD-10-CM

## (undated) NOTE — LETTER
05/16/19        [facility]  [address], [address] [facility]      Dear Litzy Meza,    [address] [facility] records indicate that you have outstanding lab work and or testing that was ordered for you and has not yet been completed:  Lab Orders Placed

## (undated) NOTE — LETTER
05/18/20        [facility]  [address] [facility]      Dear Brad Leal,    [address] [facility] records indicate that you have outstanding lab work and or testing that was ordered for you and has not yet been completed:  Orders Placed This